# Patient Record
Sex: FEMALE | Race: WHITE | Employment: FULL TIME | URBAN - METROPOLITAN AREA
[De-identification: names, ages, dates, MRNs, and addresses within clinical notes are randomized per-mention and may not be internally consistent; named-entity substitution may affect disease eponyms.]

---

## 2017-07-30 ENCOUNTER — RADIANT APPOINTMENT (OUTPATIENT)
Dept: GENERAL RADIOLOGY | Facility: CLINIC | Age: 52
End: 2017-07-30
Payer: COMMERCIAL

## 2017-07-30 ENCOUNTER — OFFICE VISIT (OUTPATIENT)
Dept: URGENT CARE | Facility: URGENT CARE | Age: 52
End: 2017-07-30
Payer: COMMERCIAL

## 2017-07-30 VITALS
SYSTOLIC BLOOD PRESSURE: 148 MMHG | HEART RATE: 79 BPM | OXYGEN SATURATION: 97 % | WEIGHT: 175 LBS | TEMPERATURE: 98.1 F | HEIGHT: 62 IN | DIASTOLIC BLOOD PRESSURE: 86 MMHG | BODY MASS INDEX: 32.2 KG/M2

## 2017-07-30 DIAGNOSIS — M79.671 RIGHT FOOT PAIN: ICD-10-CM

## 2017-07-30 DIAGNOSIS — M79.671 RIGHT FOOT PAIN: Primary | ICD-10-CM

## 2017-07-30 DIAGNOSIS — M77.9 TENDINITIS: ICD-10-CM

## 2017-07-30 PROCEDURE — 73630 X-RAY EXAM OF FOOT: CPT | Mod: RT

## 2017-07-30 PROCEDURE — 99213 OFFICE O/P EST LOW 20 MIN: CPT | Performed by: FAMILY MEDICINE

## 2017-07-30 NOTE — NURSING NOTE
"Chief Complaint   Patient presents with     Urgent Care     Foot Injury     c/o foot pain for 1 day       Initial /86  Pulse 79  Temp 98.1  F (36.7  C) (Tympanic)  Ht 5' 1.5\" (1.562 m)  Wt 175 lb (79.4 kg)  SpO2 97%  BMI 32.53 kg/m2 Estimated body mass index is 32.53 kg/(m^2) as calculated from the following:    Height as of this encounter: 5' 1.5\" (1.562 m).    Weight as of this encounter: 175 lb (79.4 kg).  Medication Reconciliation: complete   Mulu Saeed MA    "

## 2017-07-30 NOTE — PATIENT INSTRUCTIONS
R.I.C.E.    R.I.C.E. stands for Rest, Ice, Compression, and Elevation. Doing these things helps limit pain and swelling after an injury. R.I.C.E. also helps injuries heal faster. Use R.I.C.E. for sprains, strains, and severe bruises or bumps. Follow the tips on this handout and begin R.I.C.E. as soon as possible after an injury.  ? Rest  Pain is your body s way of telling you to rest an injured area. Whether you have hurt an elbow, hand, foot, or knee, limiting its use will prevent further injury and help you heal.  ? Ice  Applying ice right after an injury helps prevent swelling and reduce pain. Don t place ice directly on your skin.    Wrap a cold pack or bag of ice in a thin cloth. Place it over the injured area.    Ice for 10 minutes every 3 hours. Don t ice for more than 20 minutes at a time.  ? Compression  Putting pressure (compression) on an injury helps prevent swelling and provides support.    Wrap the injured area firmly with an elastic bandage. If your hand or foot tingles, becomes discolored, or feels cold to the touch, the bandage may be too tight. Rewrap it more loosely.    If your bandage becomes too loose, rewrap it.    Do not wear an elastic bandage overnight.  ? Elevation  Keeping an injury elevated helps reduce swelling, pain, and throbbing. Elevation is most effective when the injury is kept elevated higher than the heart.     Call your healthcare provider if you notice any of the following:    Fingers or toes feel numb, are cold to the touch, or change color    Skin looks shiny or tight    Pain, swelling, or bruising worsens and is not improved with elevation   Date Last Reviewed: 9/3/2015    2250-3757 [employer]. [address] [place] [address], [place] [address]. All rights reserved. This information is not intended as a substitute for professional medical care. Always follow your healthcare professional's instructions.        What is Tendonitis of the Foot?  When you use a set of  muscles too much, you re likely to strain the tendons (soft tissues) that connect those muscles to your bones. At first, pain or swelling may come and go quickly. But if you do too much too soon, your muscles may overtire again. The strain may cause a tendon s outer covering to swell or small fibers in a tendon to pull apart. If you keep pushing your muscles, damage to the tendons adds up, and tendonitis develops. Over time, pain and swelling may limit your activities. But with your doctor s help, tendonitis can be controlled. Both your symptoms and your risk of future problems including tendon rupture can be reduced.       The back of your foot  The Achilles tendon connects the calf muscle to the heel bone. If tendonitis occurs here, you may feel pain when your foot touches down or when your heel lifts off the ground.   The front of your foot  The anterior tibial tendon helps control the front of your foot when it meets the ground. If this tendon is strained, you may feel pain when you go down stairs or walk or run on hills.     The inside of your foot  The posterior tibial tendon runs along the inside of the ankle and foot. If this tendon is strained, your foot may hurt when it moves forward to push off the ground. Or you may feel pain when your heel shifts from side to side.   The outside of your foot  The peroneal tendon wraps across the bottom of your foot, from the outside to the inside. Tendonitis here may cause pain when you stand or push off the ground and when walking on uneven surfaces.   Date Last Reviewed: 9/21/2015 2000-2017 [employer]. [address] [place] [address], [place] [address]. All rights reserved. This information is not intended as a substitute for professional medical care. Always follow your healthcare professional's instructions.
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
## 2017-07-30 NOTE — PROGRESS NOTES
"SUBJECTIVE:  Jenni Anderson, a 52 year old female scheduled an appointment to discuss the following issues:     Right foot pain  Tendinitis    Medical, social, surgical, and family histories reviewed.    Urgent Care      Foot Injury  c/o foot pain for 1 day      Pt has just recently driven across the country to [place] and back (14 hours).  Pt c/o pain at the medial aspect of her right foot.  No fall or trauma.  No open wound or paresthesia.    ROS:  See HPI.  No nausea/vomiting.  No fever/chills.  No chest pain/SOB.  No BM/urine problems.  No syncope.      OBJECTIVE:  /86  Pulse 79  Temp 98.1  F (36.7  C) (Tympanic)  Ht 5' 1.5\" (1.562 m)  Wt 175 lb (79.4 kg)  SpO2 97%  BMI 32.53 kg/m2  EXAM:  GENERAL APPEARANCE: alert and no distress  EYES: Eyes grossly normal to inspection, PERRL and conjunctivae and sclerae normal  HENT: ear canals and TM's normal and nose and mouth without ulcers or lesions  NECK: no adenopathy, no asymmetry, masses, or scars and thyroid normal to palpation  RESP: lungs clear to auscultation - no rales, rhonchi or wheezes  CV: regular rates and rhythm, normal S1 S2, no S3 or S4 and no murmur, click or rub  MS: extremities normal- no gross deformities noted; some tenderness at navicular bone; no crepitus or other bony tenderness; bilateral ankles and knees and hips exam normal. Mild discomfort on walking, no limp.  No open wound or paresthesia.  No redness or swelling; neurovascularly intact bilateral lower extremities  SKIN: no suspicious lesions or rashes  NEURO: Normal strength and tone, mentation intact and speech normal    ASSESSMENT/PLAN:  (M79.671) Right foot pain  (primary encounter diagnosis)  Comment: xray right foot: FINDINGS: Small inferior calcaneal spur. No acute abnormality of the  right foot. IMPRESSION: Nothing acute. Plantar calcaneal spur noted.  Plan: XR Foot Right G/E 3 Views           (M77.9) Tendinitis  Plan: Care instructions given.  Rest, ice, elevation, " gentle exercises.  Pt to f/up PCP in 2 weeks if no improvement or worsening.  Warning signs and symptoms explained.

## 2017-08-05 ENCOUNTER — HEALTH MAINTENANCE LETTER (OUTPATIENT)
Age: 52
End: 2017-08-05

## 2017-10-06 ENCOUNTER — THERAPY VISIT (OUTPATIENT)
Dept: PHYSICAL THERAPY | Facility: CLINIC | Age: 52
End: 2017-10-06
Payer: COMMERCIAL

## 2017-10-06 DIAGNOSIS — N39.3 STRESS INCONTINENCE OF URINE: Primary | ICD-10-CM

## 2017-10-06 PROCEDURE — 97535 SELF CARE MNGMENT TRAINING: CPT | Mod: GP | Performed by: PHYSICAL THERAPIST

## 2017-10-06 PROCEDURE — 97161 PT EVAL LOW COMPLEX 20 MIN: CPT | Mod: GP | Performed by: PHYSICAL THERAPIST

## 2017-10-06 PROCEDURE — 97112 NEUROMUSCULAR REEDUCATION: CPT | Mod: GP | Performed by: PHYSICAL THERAPIST

## 2017-10-06 NOTE — LETTER
[facility]  [address] [place] [id number] [phone]    October 10, 2017    Re: Jenni Anderson   :   1965  MRN:  [id number]   REFERRING PHYSICIAN:   Zora Vang    [facility]  Date of Initial Evaluation:  10/6/2017  Visits:  Rxs Used: 1  Reason for Referral:  Stress incontinence of urine    EVALUATION SUMMARY    [facility] Initial Evaluation  Subjective:  Patient is a 52 year old female presenting with rehab pelvic hpi. The history is provided by the patient.   Jenni Anderson is a 52 year old female with a incontinence condition.  Condition occurred with:  Insidious onset.  Condition occurred: other.  This is a chronic condition  MD order 2017  HPI: KAYLEE worse in last 6 mths with activity.  Practiced kegels for 6 mths with no improvement.  Considering sling placement but wants to learn how to do exercises..    Patient reports pain:  N/a.     and is intermittent    Pain is the same all the time.  Symptoms are exacerbated by jumping, coughing, laughing, intercourse, sneezing and running and relieved by nothing.  Since onset symptoms are gradually worsening.    Previous treatment includes other (kegels on her own).  There was no improvement following previous treatment.  General health as reported by patient is good.                  Red flags:  None as reported by the patient.    Pertinent medical history includes:  Overweight, high blood pressure, depression and migraines.  Medical allergies: none.  Other surgeries include:  None.  Current medications:  Sleep medication, anti-depressants and high blood pressure medication.  Current occupation is teacher.    Primary job tasks include:  Prolonged sitting and prolonged standing.                  Objective:  Pelvic Dysfunction Evaluation:    Bladder/Pelvic Problems:    Storage Problem:  Stress incontinence and frequency  Emptying Problem:   Strain to void and incomplete emptying      Diagnostic Tests:  none    Pelvic Clock Exam:  normal    External Assessment:    Skin Condition:  Normal    Bearing Down/Coughing:  Normal    Introitus:  Normal  Muscle Contraction/Perineal Mobility:  Substitution and elevation and urogential triangle descent    Re: Jenni Anderson   :   1965    Internal Assessment:    Contraction/Grade:  Fair squeeze, definite lift (3)  Accessory Muscle use-Gluteals:  Yes  Accessory Muscle use-Adductors:  Yes  Symmetry of Contraction Response:  Inc valsalva, sluggish return to resting; poor coordination w abdominals    SEMG Biofeedback:    Suraface electrode placement--Perianal:  Yes  Baseline EMG PM:  4-6 mV  Peak pelvic muscle contraction:  8-12 mV, slow return to baseline    Position:  SupineAdditional History:  Delivery History:    Number of Pregnancies: 2  Number of Live Births: 2  Caffeine Consumption:  Moderate          Urination:  Do you leak on the way to the bathroom or with a strong urge to void? Yes   Do you leak with cough,sneeze, jumping, running?Yes   Any other activities that cause leaking? Yes   Do you have triggers that make you feel you can't wait to go to the bathroom? No What are they? na.  Type of pad and number used per day? 1  When you leak what is the amount? Small to med  How long can you delay the need to urinate? Will go if I have access; can hold at work for long periods of time.   Do you feel excessive pressure in pelvic floor:no.  When? na  Frequency of daytime urination:every 30 min to 5 hrs  Frequency of nighttime urination:0  Can you stop the flow of urine when on the toilet? partially  Is the volume of urine passed usually: average. (8sec rule= 250ml with average bladder storing 400-600ml)  Do you strain to pass urine? Yes  Do you have a slow or hesitant urinary stream? No  Do you have difficulty initiating the urine stream? No  Is urination painful? No  How many bladder infections have  "you had in last 12 months?0  Fluid intake(one glass is 8oz or one cup) 8glasses/day, 3caffinated glasses/day  0 alcohol glasses/day.    Bowel habits:  Frequency of bowel movements? 1-2 times a day  Consistancy of stool? soft formed, [place] Stool Scale 4  Do you ignore the urge to defecate? No  Do you strain to pass stool? No    Pelvic Pain:  Do you have any pelvic pain with intercourse, exams, use of tampons? No  Is initial penetration during intercourse painful? No  Is deeper penetration painful? No  Do you use lubricant? No What kind? na  Are you sexually active?Yes  Have you ever been worried for your physical safety? No  Have you practiced the PF(kegel) exercises for 4 or more weeks?yes  Re: Jenni Anderson   :   1965    Marinoff Scale:Level na  (Level 3: Abstinence from intercourse because of severe pain. Level 2: Painful intercourse which limites frequency of activity. Level 1: Painful intercourse not severe enough to prevent activity.)    Treatment/Education provided this session (see flow sheet for additional information):    Self Care Management/Patient education (12 min): Today's session consisted of education regarding pelvic floor muscle anatomy, normal bladder function, urge suppression techniques and/or relaxation techniques as indicated, and instruction in how to complete a bladder diary for assessment next visit. Depending on patient presentation, timed voids, double voids, and proper fluid/fiber intake discussed. Pt was instructed in the pathoanatomy of the pelvic floor utilizing pelvic model.  We discussed what pelvic floor physical therapy is, components of exam, and typical patient progression. Prior to internal PFM exam,  patient was told that they were in control of exam progression, and if at any time were uncomfortable and wished to discontinue we would.      NMR (12 min): Patient instruction in correct isolation of PFM/TrA then coordinated contraction of \"canister\" muscles: " diaphragm, Transverse Abdominals, PFM, and posterior spine musculature.  Educated in initiating contraction from anal sphinctor, elevating through PFM, contraction of TrA, while exhaling slowly.  Cued for maximal and sub-maximal contractions, using hip rotators and adductors for overflow if needed.  Pt required cuing for each progression, with decreasing feedback as tolerated.  Instructed to avoid Valsalva/increased inter-abdominal pressure.  Pt cued through verbal, tactile (internal and external), and visual cuing utilizing biofeedback.  For HEP, pt encouraged to separate each phase of the exercise, then work towards coordination of the phases together with good breath technique with goal of developing good neuromuscular control of area.    NMR pelvic pain (12 min):  Pt education regarding contributing factors to pelvic pain and dysfunction related to overactivity in the pelvic floor.  Included resources for relaxed awareness and pelvic floor quieting techniqes.  Extra time spent describing pelvic floor muscle exam and treatment plan/goals, with attention to potential history that may contribute to current symptoms.  Included resources for home release techniques using dilators and/or thera wand as indicated.  Recommended partner involvement if available.  Discussed in detail potential physiological and behavioral components of pelvic pain.  Demonstrated/performed techniques for input to painful area while using visualization and relaxation.      Assessment/Plan:    Patient is a 52 year old female with pelvic complaints.    Patient has the following significant findings with corresponding treatment plan.                Diagnosis 1:  UI  Decreased strength - therapeutic exercise and therapeutic activities  Decreased proprioception - neuro re-education and therapeutic activities  Impaired muscle performance - neuro re-education  Decreased function - therapeutic activities    Therapy Evaluation Codes:   1) History  comprised of:   Personal factors that impact the plan of care:      Anxiety.    Comorbidity factors that impact the plan of care are:      None.     Medications impacting care: None.  2) Examination of Body Systems comprised of:   Body structures and functions that impact the plan of care:      Pelvis.   Activity limitations that impact the plan of care are:      Stress incontinence.  Re: Jenni Anderson   :   1965    3) Clinical presentation characteristics are:   Stable/Uncomplicated.  4) Decision-Making    Low complexity using standardized patient assessment instrument and/or measureable assessment of functional outcome.  Cumulative Therapy Evaluation is: Low complexity.    Previous and current functional limitations:  (See Goal Flow Sheet for this information)    Short term and Long term goals: (See Goal Flow Sheet for this information)     Communication ability:  Patient appears to be able to clearly communicate and understand verbal and written communication and follow directions correctly.  Treatment Explanation - The following has been discussed with the patient:   RX ordered/plan of care  Anticipated outcomes  Possible risks and side effects  This patient would benefit from PT intervention to resume normal activities.   Rehab potential is good.    Frequency:  1 X week, once daily  Duration:  for 8 weeks  Discharge Plan:  Achieve all LTG.  Independent in home treatment program.  Reach maximal therapeutic benefit.    Please refer to the daily flowsheet for treatment today, total treatment time and time spent performing 1:1 timed codes.         Thank you for your referral.    INQUIRIES  Therapist: Ella Sears, PT, OCS, Cer. MDT   [facility]  [address] [place] [address]  Phone: [phone]  Fax: [phone]

## 2017-10-06 NOTE — PROGRESS NOTES
[facility] Initial Evaluation    Subjective:    Patient is a 52 year old female presenting with rehab pelvic hpi. The history is provided by the patient.   Jenni Anderson is a 52 year old female with a incontinence condition.  Condition occurred with:  Insidious onset.  Condition occurred: other.  This is a chronic condition  MD ojeda 9/12/2017  HPI: KAYLEE worse in last 6 mths with activity.  Practiced kegels for 6 mths with no improvement.  Considering sling placement but wants to learn how to do exercises..    Patient reports pain:  N/a.     and is intermittent    Pain is the same all the time.  Symptoms are exacerbated by jumping, coughing, laughing, intercourse, sneezing and running and relieved by nothing.  Since onset symptoms are gradually worsening.    Previous treatment includes other (kegels on her own).  There was no improvement following previous treatment.  General health as reported by patient is good.                      Red flags:  None as reported by the patient.                        Objective:    System                                 Pelvic Dysfunction Evaluation:    Bladder/Pelvic Problems:    Storage Problem:  Stress incontinence and frequency  Emptying Problem:  Strain to void and incomplete emptying      Diagnostic Tests:  none                            Pelvic Clock Exam:  normal                External Assessment:    Skin Condition:  Normal    Bearing Down/Coughing:  Normal    Introitus:  Normal  Muscle Contraction/Perineal Mobility:  Substitution and elevation and urogential triangle descent  Internal Assessment:      Contraction/Grade:  Fair squeeze, definite lift (3)    Accessory Muscle use-Gluteals:  Yes  Accessory Muscle use-Adductors:  Yes  Symmetry of Contraction Response:  Inc valsalva, sluggish return to resting; poor coordination w abdominals  SEMG Biofeedback:        Suraface electrode placement--Perianal:  Yes  Baseline EMG PM:  4-6 mV    Peak pelvic  muscle contraction:  8-12 mV, slow return to baseline      Position:  SupineAdditional History:  Delivery History:    Number of Pregnancies: 2  Number of Live Births: 2  Caffeine Consumption:  Moderate                     General     ROS     Urination:  Do you leak on the way to the bathroom or with a strong urge to void? Yes   Do you leak with cough,sneeze, jumping, running?Yes   Any other activities that cause leaking? Yes   Do you have triggers that make you feel you can't wait to go to the bathroom? No What are they? na.  Type of pad and number used per day? 1  When you leak what is the amount? Small to med  How long can you delay the need to urinate? Will go if I have access; can hold at work for long periods of time.   Do you feel excessive pressure in pelvic floor:no.  When? na  Frequency of daytime urination:every 30 min to 5 hrs  Frequency of nighttime urination:0  Can you stop the flow of urine when on the toilet? partially  Is the volume of urine passed usually: average. (8sec rule= 250ml with average bladder storing 400-600ml)  Do you strain to pass urine? Yes  Do you have a slow or hesitant urinary stream? No  Do you have difficulty initiating the urine stream? No  Is urination painful? No  How many bladder infections have you had in last 12 months?0  Fluid intake(one glass is 8oz or one cup) 8glasses/day, 3caffinated glasses/day  0 alcohol glasses/day.  Bowel habits:  Frequency of bowel movements? 1-2 times a day  Consistancy of stool? soft formed, [place] Stool Scale 4  Do you ignore the urge to defecate? No  Do you strain to pass stool? No  Pelvic Pain:  Do you have any pelvic pain with intercourse, exams, use of tampons? No  Is initial penetration during intercourse painful? No  Is deeper penetration painful? No  Do you use lubricant? No What kind? na  Are you sexually active?Yes  Have you ever been worried for your physical safety? No  Have you practiced the PF(kegel) exercises for 4 or  "more weeks?yes  Marinoff Scale:Level na  (Level 3: Abstinence from intercourse because of severe pain. Level 2: Painful intercourse which limites frequency of activity. Level 1: Painful intercourse not severe enough to prevent activity.)    Treatment/Education provided this session (see flow sheet for additional information):    Self Care Management/Patient education (12 min): Today's session consisted of education regarding pelvic floor muscle anatomy, normal bladder function, urge suppression techniques and/or relaxation techniques as indicated, and instruction in how to complete a bladder diary for assessment next visit. Depending on patient presentation, timed voids, double voids, and proper fluid/fiber intake discussed. Pt was instructed in the pathoanatomy of the pelvic floor utilizing pelvic model.  We discussed what pelvic floor physical therapy is, components of exam, and typical patient progression. Prior to internal PFM exam,  patient was told that they were in control of exam progression, and if at any time were uncomfortable and wished to discontinue we would.        NMR (12 min): Patient instruction in correct isolation of PFM/TrA then coordinated contraction of \"canister\" muscles: diaphragm, Transverse Abdominals, PFM, and posterior spine musculature.  Educated in initiating contraction from anal sphinctor, elevating through PFM, contraction of TrA, while exhaling slowly.  Cued for maximal and sub-maximal contractions, using hip rotators and adductors for overflow if needed.  Pt required cuing for each progression, with decreasing feedback as tolerated.  Instructed to avoid Valsalva/increased inter-abdominal pressure.  Pt cued through verbal, tactile (internal and external), and visual cuing utilizing biofeedback.  For HEP, pt encouraged to separate each phase of the exercise, then work towards coordination of the phases together with good breath technique with goal of developing good neuromuscular " control of area.        NMR pelvic pain (12 min):  Pt education regarding contributing factors to pelvic pain and dysfunction related to overactivity in the pelvic floor.  Included resources for relaxed awareness and pelvic floor quieting techniqes.  Extra time spent describing pelvic floor muscle exam and treatment plan/goals, with attention to potential history that may contribute to current symptoms.  Included resources for home release techniques using dilators and/or thera wand as indicated.  Recommended partner involvement if available.  Discussed in detail potential physiological and behavioral components of pelvic pain.  Demonstrated/performed techniques for input to painful area while using visualization and relaxation.                      Assessment/Plan:      Patient is a 52 year old female with pelvic complaints.    Patient has the following significant findings with corresponding treatment plan.                Diagnosis 1:  UI  Decreased strength - therapeutic exercise and therapeutic activities  Decreased proprioception - neuro re-education and therapeutic activities  Impaired muscle performance - neuro re-education  Decreased function - therapeutic activities    Therapy Evaluation Codes:   1) History comprised of:   Personal factors that impact the plan of care:      Anxiety.    Comorbidity factors that impact the plan of care are:      None.     Medications impacting care: None.  2) Examination of Body Systems comprised of:   Body structures and functions that impact the plan of care:      Pelvis.   Activity limitations that impact the plan of care are:      Stress incontinence.  3) Clinical presentation characteristics are:   Stable/Uncomplicated.  4) Decision-Making    Low complexity using standardized patient assessment instrument and/or measureable assessment of functional outcome.  Cumulative Therapy Evaluation is: Low complexity.    Previous and current functional limitations:  (See Goal Flow  Sheet for this information)    Short term and Long term goals: (See Goal Flow Sheet for this information)     Communication ability:  Patient appears to be able to clearly communicate and understand verbal and written communication and follow directions correctly.  Treatment Explanation - The following has been discussed with the patient:   RX ordered/plan of care  Anticipated outcomes  Possible risks and side effects  This patient would benefit from PT intervention to resume normal activities.   Rehab potential is good.    Frequency:  1 X week, once daily  Duration:  for 8 weeks  Discharge Plan:  Achieve all LTG.  Independent in home treatment program.  Reach maximal therapeutic benefit.    Please refer to the daily flowsheet for treatment today, total treatment time and time spent performing 1:1 timed codes.

## 2017-10-10 PROBLEM — N39.3 STRESS INCONTINENCE OF URINE: Status: ACTIVE | Noted: 2017-10-10

## 2017-10-10 NOTE — PROGRESS NOTES
Subjective:    Patient is a 52 year old female presenting with rehab pelvic hpi.                                      Pertinent medical history includes:  Overweight, high blood pressure, depression and migraines.  Medical allergies: none.  Other surgeries include:  None.  Current medications:  Sleep medication, anti-depressants and high blood pressure medication.  Current occupation is teacher.    Primary job tasks include:  Prolonged sitting and prolonged standing.                                Objective:    System    Physical Exam    General     ROS    Assessment/Plan:

## 2017-10-24 ENCOUNTER — THERAPY VISIT (OUTPATIENT)
Dept: PHYSICAL THERAPY | Facility: CLINIC | Age: 52
End: 2017-10-24
Payer: COMMERCIAL

## 2017-10-24 DIAGNOSIS — N39.3 STRESS INCONTINENCE OF URINE: ICD-10-CM

## 2017-10-24 PROCEDURE — 97535 SELF CARE MNGMENT TRAINING: CPT | Mod: GP | Performed by: PHYSICAL THERAPIST

## 2017-10-24 PROCEDURE — 97110 THERAPEUTIC EXERCISES: CPT | Mod: GP | Performed by: PHYSICAL THERAPIST

## 2019-06-07 ENCOUNTER — OFFICE VISIT (OUTPATIENT)
Dept: URGENT CARE | Facility: URGENT CARE | Age: 54
End: 2019-06-07

## 2019-06-07 VITALS
RESPIRATION RATE: 12 BRPM | WEIGHT: 160 LBS | HEART RATE: 70 BPM | DIASTOLIC BLOOD PRESSURE: 76 MMHG | TEMPERATURE: 98.3 F | BODY MASS INDEX: 31.41 KG/M2 | SYSTOLIC BLOOD PRESSURE: 124 MMHG | HEIGHT: 60 IN

## 2019-06-07 DIAGNOSIS — S00.03XA CONTUSION OF FACE, SCALP AND NECK, INITIAL ENCOUNTER: Primary | ICD-10-CM

## 2019-06-07 DIAGNOSIS — S10.93XA CONTUSION OF FACE, SCALP AND NECK, INITIAL ENCOUNTER: Primary | ICD-10-CM

## 2019-06-07 DIAGNOSIS — S00.83XA CONTUSION OF FACE, SCALP AND NECK, INITIAL ENCOUNTER: Primary | ICD-10-CM

## 2019-06-07 PROCEDURE — 99213 OFFICE O/P EST LOW 20 MIN: CPT | Performed by: INTERNAL MEDICINE

## 2019-06-07 RX ORDER — LOSARTAN POTASSIUM 25 MG/1
25 TABLET ORAL EVERY MORNING
COMMUNITY

## 2019-06-07 ASSESSMENT — MIFFLIN-ST. JEOR: SCORE: 1258.61
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
## 2019-07-12 ENCOUNTER — HOSPITAL ENCOUNTER (EMERGENCY)
Facility: CLINIC | Age: 54
Discharge: HOME | End: 2019-07-12
Attending: EMERGENCY MEDICINE | Admitting: EMERGENCY MEDICINE
Payer: COMMERCIAL

## 2019-07-12 ENCOUNTER — APPOINTMENT (OUTPATIENT)
Dept: ULTRASOUND IMAGING | Facility: CLINIC | Age: 54
End: 2019-07-12
Attending: EMERGENCY MEDICINE
Payer: COMMERCIAL

## 2019-07-12 VITALS
HEIGHT: 61 IN | BODY MASS INDEX: 31.15 KG/M2 | OXYGEN SATURATION: 96 % | TEMPERATURE: 98.4 F | WEIGHT: 165 LBS | RESPIRATION RATE: 18 BRPM | SYSTOLIC BLOOD PRESSURE: 128 MMHG | HEART RATE: 73 BPM | DIASTOLIC BLOOD PRESSURE: 81 MMHG

## 2019-07-12 DIAGNOSIS — D25.9 UTERINE LEIOMYOMA, UNSPECIFIED LOCATION: ICD-10-CM

## 2019-07-12 LAB
BASOPHILS # BLD AUTO: 0 10E9/L (ref 0–0.2)
BASOPHILS NFR BLD AUTO: 0.4 %
DIFFERENTIAL METHOD BLD: ABNORMAL
EOSINOPHIL # BLD AUTO: 0.2 10E9/L (ref 0–0.7)
EOSINOPHIL NFR BLD AUTO: 1.5 %
ERYTHROCYTE [DISTWIDTH] IN BLOOD BY AUTOMATED COUNT: 12.4 % (ref 10–15)
HCG SERPL QL: NEGATIVE
HCT VFR BLD AUTO: 40.7 % (ref 35–47)
HGB BLD-MCNC: 14.2 G/DL (ref 11.7–15.7)
IMM GRANULOCYTES # BLD: 0 10E9/L (ref 0–0.4)
IMM GRANULOCYTES NFR BLD: 0.1 %
LYMPHOCYTES # BLD AUTO: 2.2 10E9/L (ref 0.8–5.3)
LYMPHOCYTES NFR BLD AUTO: 20.1 %
MCH RBC QN AUTO: 30.6 PG (ref 26.5–33)
MCHC RBC AUTO-ENTMCNC: 34.9 G/DL (ref 31.5–36.5)
MCV RBC AUTO: 88 FL (ref 78–100)
MONOCYTES # BLD AUTO: 0.5 10E9/L (ref 0–1.3)
MONOCYTES NFR BLD AUTO: 4.9 %
NEUTROPHILS # BLD AUTO: 8 10E9/L (ref 1.6–8.3)
NEUTROPHILS NFR BLD AUTO: 73 %
NRBC # BLD AUTO: 0 10*3/UL
NRBC BLD AUTO-RTO: 0 /100
PLATELET # BLD AUTO: 489 10E9/L (ref 150–450)
RBC # BLD AUTO: 4.64 10E12/L (ref 3.8–5.2)
SPECIMEN SOURCE: NORMAL
WBC # BLD AUTO: 11 10E9/L (ref 4–11)
WET PREP SPEC: NORMAL

## 2019-07-12 PROCEDURE — 84703 CHORIONIC GONADOTROPIN ASSAY: CPT | Performed by: EMERGENCY MEDICINE

## 2019-07-12 PROCEDURE — 87591 N.GONORRHOEAE DNA AMP PROB: CPT | Performed by: EMERGENCY MEDICINE

## 2019-07-12 PROCEDURE — 76830 TRANSVAGINAL US NON-OB: CPT

## 2019-07-12 PROCEDURE — 25000132 ZZH RX MED GY IP 250 OP 250 PS 637: Performed by: EMERGENCY MEDICINE

## 2019-07-12 PROCEDURE — 85025 COMPLETE CBC W/AUTO DIFF WBC: CPT | Performed by: EMERGENCY MEDICINE

## 2019-07-12 PROCEDURE — 87210 SMEAR WET MOUNT SALINE/INK: CPT | Performed by: EMERGENCY MEDICINE

## 2019-07-12 PROCEDURE — 87491 CHLMYD TRACH DNA AMP PROBE: CPT | Performed by: EMERGENCY MEDICINE

## 2019-07-12 PROCEDURE — 99284 EMERGENCY DEPT VISIT MOD MDM: CPT | Mod: 25

## 2019-07-12 RX ORDER — IBUPROFEN 600 MG/1
600 TABLET, FILM COATED ORAL ONCE
Status: COMPLETED | OUTPATIENT
Start: 2019-07-12 | End: 2019-07-12

## 2019-07-12 RX ORDER — ACETAMINOPHEN 325 MG/1
650 TABLET ORAL ONCE
Status: COMPLETED | OUTPATIENT
Start: 2019-07-12 | End: 2019-07-12

## 2019-07-12 RX ORDER — HYDROCODONE BITARTRATE AND ACETAMINOPHEN 5; 325 MG/1; MG/1
1 TABLET ORAL ONCE
Status: COMPLETED | OUTPATIENT
Start: 2019-07-12 | End: 2019-07-12

## 2019-07-12 RX ADMIN — HYDROCODONE BITARTRATE AND ACETAMINOPHEN 1 TABLET: 5; 325 TABLET ORAL at 16:12

## 2019-07-12 RX ADMIN — IBUPROFEN 600 MG: 600 TABLET ORAL at 16:12

## 2019-07-12 RX ADMIN — ACETAMINOPHEN 650 MG: 325 TABLET, FILM COATED ORAL at 16:12

## 2019-07-12 ASSESSMENT — MIFFLIN-ST. JEOR: SCORE: 1290.82

## 2019-07-12 NOTE — ED AVS SNAPSHOT
Emergency Department  [address] [facility] [address]  Phone:  [phone]  Fax:  [phone]                                    Jenni Anderson   MRN: [id number]    Department:   Emergency Department   Date of Visit:  7/12/2019           After Visit Summary Signature Page    I have received my discharge instructions, and my questions have been answered. I have discussed any challenges I see with this plan with the nurse or doctor.    ..........................................................................................................................................  Patient/Patient Representative Signature      ..........................................................................................................................................  Patient Representative Print Name and Relationship to Patient    ..................................................               ................................................  Date                                   Time    ..........................................................................................................................................  Reviewed by Signature/Title    ...................................................              ..............................................  Date                                               Time          22EPIC Rev 08/18
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
## 2019-07-12 NOTE — ED NOTES
Pt to Ultrasound.  Yamilex Patel RN,.......................................... 7/12/2019   5:21 PM

## 2019-07-12 NOTE — ED TRIAGE NOTES
Patient states her IUD may have shifted. States she has had bleeding for weeks but pelvic pain that has increased in severity today.

## 2019-07-14 LAB
C TRACH DNA SPEC QL NAA+PROBE: NEGATIVE
N GONORRHOEA DNA SPEC QL NAA+PROBE: NEGATIVE
SPECIMEN SOURCE: NORMAL
SPECIMEN SOURCE: NORMAL

## 2019-10-19 ENCOUNTER — HOSPITAL ENCOUNTER (EMERGENCY)
Facility: CLINIC | Age: 54
Discharge: HOME | End: 2019-10-19
Attending: EMERGENCY MEDICINE | Admitting: EMERGENCY MEDICINE
Payer: COMMERCIAL

## 2019-10-19 VITALS
WEIGHT: 175 LBS | RESPIRATION RATE: 16 BRPM | OXYGEN SATURATION: 98 % | DIASTOLIC BLOOD PRESSURE: 92 MMHG | BODY MASS INDEX: 33.04 KG/M2 | TEMPERATURE: 97.5 F | HEIGHT: 61 IN | SYSTOLIC BLOOD PRESSURE: 158 MMHG

## 2019-10-19 DIAGNOSIS — H93.8X1 SENSATION OF FULLNESS IN RIGHT EAR: ICD-10-CM

## 2019-10-19 DIAGNOSIS — H93.11 TINNITUS, RIGHT: ICD-10-CM

## 2019-10-19 DIAGNOSIS — G47.00 INSOMNIA, UNSPECIFIED TYPE: ICD-10-CM

## 2019-10-19 PROCEDURE — 99283 EMERGENCY DEPT VISIT LOW MDM: CPT

## 2019-10-19 PROCEDURE — 25000125 ZZHC RX 250: Performed by: EMERGENCY MEDICINE

## 2019-10-19 RX ORDER — LORAZEPAM 1 MG/1
1 TABLET ORAL
Qty: 5 TABLET | Refills: 0 | Status: SHIPPED | OUTPATIENT
Start: 2019-10-19 | End: 2021-01-27

## 2019-10-19 RX ORDER — OXYMETAZOLINE HYDROCHLORIDE 0.05 G/100ML
2 SPRAY NASAL ONCE
Status: COMPLETED | OUTPATIENT
Start: 2019-10-19 | End: 2019-10-19

## 2019-10-19 RX ADMIN — OXYMETAZOLINE HYDROCHLORIDE 2 SPRAY: 0.5 SPRAY NASAL at 05:20

## 2019-10-19 ASSESSMENT — MIFFLIN-ST. JEOR: SCORE: 1336.17

## 2019-10-19 NOTE — DISCHARGE INSTRUCTIONS
May use Afrin for the next 3 days, twice a day to help with nasal congestion as this may be affecting the fullness that you are having in your ear.  If this improves you may want to try a nasal steroid such as Flonase which you can buy over-the-counter as well.  In regards to the ringing please follow-up with ENT.  If you develop severe headache, severe neck pain, double vision, difficulty walking, weakness or numbness or tingling please return to the emergency department sooner.    May try Ativan for sleep for the next several nights to help with your anxiety and sleeping.  If this is helpful you need to follow-up with your primary care doctor to determine a better course of action for long-term sleep management.

## 2019-10-19 NOTE — ED AVS SNAPSHOT
Emergency Department  [address] [facility] [address]  Phone:  [phone]  Fax:  [phone]                                    Jenni Anderson   MRN: [id number]    Department:   Emergency Department   Date of Visit:  10/19/2019           After Visit Summary Signature Page    I have received my discharge instructions, and my questions have been answered. I have discussed any challenges I see with this plan with the nurse or doctor.    ..........................................................................................................................................  Patient/Patient Representative Signature      ..........................................................................................................................................  Patient Representative Print Name and Relationship to Patient    ..................................................               ................................................  Date                                   Time    ..........................................................................................................................................  Reviewed by Signature/Title    ...................................................              ..............................................  Date                                               Time          22EPIC Rev 08/18

## 2019-10-19 NOTE — ED PROVIDER NOTES
History     Chief Complaint:  Tinnitus    HPI   Jenni Anderson is a 54 year old female with a history of hypertension who presents to the emergency department due to tinnitus. About a week ago, the patient states that she got soapy water in her ears and has had muffled hearing since then. Tonight, she states that she states that she had difficulty sleeping, which has been an ongoing problem for her. She woke up around 0130 and noticed she had ringing in her ears. She reports anxiety and a panic sensation in conjunction to the ringing which ultimately prompted her visit to the ED. She complains of feeling lightheaded as well as mild congestion but attributes these to a lack of sleep and seasonal allergies. She denies numbness/tingling, fever, chill, nausea, chest pain, diaphoresis, and headaches. She also denies use of any drugs or alcohol and recent travel. Of note, patient had recent MRA as noted below.       MRA Neck and Neck w/o and w/ contrast angiogram ([employer] 3/10/19):  1. No acute intracranial abnormality.    2. No abnormal enhancement or enhancing lesions.    3. Normal brain parenchymal morphology. Few scattered foci of T2 signal within the white matter consistent with chronic small vessel ischemic changes or sequela migraine headache.    4. Right mastoid effusion may indicate underlying mastoiditis or otitis mediaas per radiology       Allergies:  No known drug allergies    Medications:    amoxicillin-clavulanate   aspirin 81 MG   bupropion   hydrochlorothyazide  Losartan potassium  Diazepam  Melcizine    Past Medical History:    Stress incontinence of urine  Dysthymia-deactivated  Attention deficit disorder   Allergic rhinitis  Insomnia  Hypertension  Major Depressive disorder    Past Surgical History:   Section    Family History:    Heart Disease - Father  Lung Cancer - Father  Hypertension - Mother  Osteopenia -Mother  Diabetes- Father  Depression - Mother    Social  "History:  Smoking status: Never Smoker  Alcohol use: Yes  Drug use: No  The patient presents to the emergency department by herself  PCP: Meagan Vizcarra    Marital Status:   [2]    Review of Systems   All other systems reviewed and are negative.      Physical Exam     Patient Vitals for the past 24 hrs:   BP Temp Temp src Heart Rate Resp SpO2 Height Weight   10/19/19 0446 (!) 158/92 97.5  F (36.4  C) Oral 81 16 98 % 1.549 m (5' 1\") 79.4 kg (175 lb)         Physical Exam  General: Resting on the bed.  Head: No obvious trauma to head.  Ears, Nose, Throat:  External ears normal.  Nose normal.  No pharyngeal erythema, swelling or exudate.  Midline uvula.  TMs clear bilaterally, there does appear to be some pressure behind the right TM.  No evidence of exudate or effusion.  Eyes:  Conjunctivae clear.  Pupils are equal, round, and reactive.   Neck: Normal range of motion.  Neck supple.   CV: Regular rate and rhythm.  No murmurs.      Respiratory: Effort normal and breath sounds normal.  No wheezing or crackles.   Gastrointestinal: Soft.  No distension. There is no tenderness.    Neuro: Alert. Moving all extremities appropriately.  Normal speech.  CN II-XII grossly intact.  Gross muscle strength intact of the proximal and distal bilateral upper and lower extremities.  Sensation intact to light touch in all 4 extremities.  2+ patellar reflexes.    Skin: Skin is warm and dry.  No rash noted.     Emergency Department Course   Interventions:  0520 Afrin 2 sprays ears    Emergency Department Course:  Nursing notes and vitals reviewed. (0500) I performed an exam of the patient as documented above.     Medicine administered as documented above    I rechecked the patient and discussed the results of her workup thus far.     Findings and plan explained to the Patient. Patient discharged home with instructions regarding supportive care, medications, and reasons to return. The importance of close follow-up was reviewed. The " patient was prescribed Ativan.     Impression & Plan    Medical Decision Makin-year-old female with a history of hypertension on hydrochlorothiazide presents with tinnitus.  Vital signs mildly hypertensive but otherwise unremarkable.  Broad differential was pursued including not limited to medication induced, otitis media, vascular, anxiety or insomnia induced, sensorineural hearing loss or cochlear injury, etc.  Patient of note had a recent MRI MRA that showed no evidence of acute stroke, tumor, mass.  No vascular disease or aneurysm.  History does not appear consistent with dissection.  No indication for emergent imaging in the emergency department.  On examination there is no evidence of otitis media.  She had some suggestion of congestion so we tried Afrin to help with the fullness in her ears.  She did endorse a high-pitched noise which may be indicative of a sensorineural hearing loss or cochlear injury.  This would be something that needs further work-up with ENT.  Of note, patient does appear quite anxious and has not been sleeping.  These both may be exacerbating her symptoms.  We gave Afrin with some improvement but will discharge with short supply of Ativan to help with insomnia and anxiety to use at home.  She is encouraged to follow-up with her primary doctor and ENT for further assessment of this.  We discussed return precautions including severe head or neck pain, weakness or numbness or tingling, etc.  She voiced understanding the plan and felt comfortable with return precautions.  She was discharged home.    Diagnosis:    ICD-10-CM    1. Sensation of fullness in right ear H93.8X1    2. Tinnitus, right H93.11    3. Insomnia, unspecified type G47.00        Disposition:  discharged to home    Discharge Medications:  Discharge Medication List as of 10/19/2019  6:41 AM      START taking these medications    Details   LORazepam (ATIVAN) 1 MG tablet Take 1 tablet (1 mg) by mouth nightly as needed for  anxiety, Disp-5 tablet, R-0, Local Print           Scribe Disclosure:  I,  Mg Earl, am serving as a scribe on 10/19/2019 at 6:44 AM to personally document services performed by Sandra Tyson MD based on my observations and the provider's statements to me.     Scribe Disclosure:  I,  Wili Houston, am serving as a scribe on 10/19/2019 at 6:44 AM to personally document services performed by Sandra Tyson MD based on my observations and the provider's statements to me.         Wili Houston  10/19/2019    EMERGENCY DEPARTMENT       Sandra Tyson MD  10/19/19 0704

## 2019-11-05 ENCOUNTER — HEALTH MAINTENANCE LETTER (OUTPATIENT)
Age: 54
End: 2019-11-05

## 2020-11-22 ENCOUNTER — HEALTH MAINTENANCE LETTER (OUTPATIENT)
Age: 55
End: 2020-11-22

## 2020-12-08 ENCOUNTER — TRANSFERRED RECORDS (OUTPATIENT)
Dept: HEALTH INFORMATION MANAGEMENT | Facility: CLINIC | Age: 55
End: 2020-12-08

## 2021-01-07 PROBLEM — D25.1 INTRAMURAL LEIOMYOMA OF UTERUS: Status: ACTIVE | Noted: 2021-01-07

## 2021-01-07 PROBLEM — N93.9 ABNORMAL UTERINE BLEEDING: Status: ACTIVE | Noted: 2021-01-07

## 2021-01-08 DIAGNOSIS — Z11.59 ENCOUNTER FOR SCREENING FOR OTHER VIRAL DISEASES: Primary | ICD-10-CM

## 2021-01-13 ENCOUNTER — TRANSFERRED RECORDS (OUTPATIENT)
Dept: HEALTH INFORMATION MANAGEMENT | Facility: CLINIC | Age: 56
End: 2021-01-13

## 2021-01-24 ENCOUNTER — OFFICE VISIT (OUTPATIENT)
Dept: URGENT CARE | Facility: URGENT CARE | Age: 56
End: 2021-01-24
Attending: SPECIALIST
Payer: COMMERCIAL

## 2021-01-24 DIAGNOSIS — Z11.59 ENCOUNTER FOR SCREENING FOR OTHER VIRAL DISEASES: ICD-10-CM

## 2021-01-24 LAB
SARS-COV-2 RNA RESP QL NAA+PROBE: NORMAL
SPECIMEN SOURCE: NORMAL

## 2021-01-24 PROCEDURE — 87635 SARS-COV-2 COVID-19 AMP PRB: CPT | Performed by: SPECIALIST

## 2021-01-25 LAB
LABORATORY COMMENT REPORT: NORMAL
SARS-COV-2 RNA RESP QL NAA+PROBE: NEGATIVE
SPECIMEN SOURCE: NORMAL

## 2021-01-27 ENCOUNTER — ANESTHESIA EVENT (OUTPATIENT)
Dept: SURGERY | Facility: CLINIC | Age: 56
DRG: 742 | End: 2021-01-27
Payer: COMMERCIAL

## 2021-01-27 RX ORDER — ATOMOXETINE 60 MG/1
60 CAPSULE ORAL EVERY MORNING
COMMUNITY

## 2021-01-27 RX ORDER — HYDROXYZINE HYDROCHLORIDE 25 MG/1
50 TABLET, FILM COATED ORAL AT BEDTIME
COMMUNITY

## 2021-01-27 RX ORDER — UBIDECARENONE 100 MG
100 CAPSULE ORAL DAILY
COMMUNITY

## 2021-01-27 RX ORDER — HYDRALAZINE HYDROCHLORIDE 25 MG/1
50 TABLET, FILM COATED ORAL AT BEDTIME
COMMUNITY
End: 2021-01-27

## 2021-01-27 RX ORDER — TRAZODONE HYDROCHLORIDE 50 MG/1
50 TABLET, FILM COATED ORAL AT BEDTIME
COMMUNITY

## 2021-01-27 RX ORDER — DULOXETIN HYDROCHLORIDE 60 MG/1
60 CAPSULE, DELAYED RELEASE ORAL EVERY EVENING
COMMUNITY

## 2021-01-27 RX ORDER — OMEGA-3 FATTY ACIDS/FISH OIL 300-1000MG
1 CAPSULE ORAL DAILY
COMMUNITY

## 2021-01-27 RX ORDER — BUPROPION HYDROCHLORIDE 300 MG/1
300 TABLET ORAL EVERY MORNING
COMMUNITY

## 2021-01-27 NOTE — PROGRESS NOTES
PTA medications updated by Medication Scribe prior to surgery via phone call with patient      -LAST DOSES ENTERED BY NURSE-    Comments:    Medication history sources: Patient, Surescripts and H&P  Medication history source reliability: Moderate  Adherence assessment: N/A Not Observed    Significant changes made to the medication list:  None      Additional medication history information:   Pt bringing in own OTC Melatonin gummies - she would like to continue taking these while inpt due to her tinnitus     Pt states has not started Anastrozole 1mg yet (listed on surescripts); She says she will start this 2 weeks after surgery        Prior to Admission medications    Medication Sig Last Dose Taking? Auth Provider   atomoxetine (STRATTERA) 60 MG capsule Take 60 mg by mouth every morning  at AM Yes Reported, Patient   Bioflavonoid Products (BIOFLEX PO) Take 1 Dose by mouth daily 1/18/2021 Yes Reported, Patient   buPROPion (WELLBUTRIN XL) 300 MG 24 hr tablet Take 300 mg by mouth every morning  at AM Yes Reported, Patient   co-enzyme Q-10 100 MG CAPS capsule Take 100 mg by mouth daily 1/18/2021 Yes Reported, Patient   DULoxetine (CYMBALTA) 60 MG capsule Take 60 mg by mouth every evening  at PM Yes Reported, Patient   hydrochlorothiazide (HYDRODIURIL) 25 MG tablet Take 25 mg by mouth every morning   at AM Yes Reported, Patient   hydrOXYzine (ATARAX) 25 MG tablet Take 50 mg by mouth At Bedtime (2 x 25mg)  at HS Yes Reported, Patient   losartan (COZAAR) 25 MG tablet Take 25 mg by mouth every morning   at AM Yes Reported, Patient   MAGNESIUM PO Take 1 Dose by mouth daily (OTC) 1/18/2021 Yes Reported, Patient   MELATONIN PO Take 1 Dose by mouth At Bedtime  at HS Yes Reported, Patient   Multiple Vitamin (MULTIVITAMIN PO) Take 1 tablet by mouth daily 1/21/2021 Yes Reported, Patient   Nutritional Supplements (GRAPESEED EXTRACT PO) Take 1 tablet by mouth daily 1/18/2021 Yes Reported, Patient   omega 3 1000 MG CAPS Take 1 Dose by  "mouth daily 1/22/2021 Yes Reported, Patient   OVER-THE-COUNTER Take 1 Dose by mouth daily \"Phytoceramides\" 1/18/2021 Yes Reported, Patient   traZODone (DESYREL) 50 MG tablet Take 50 mg by mouth At Bedtime  at  Yes Reported, Patient       "

## 2021-01-28 ENCOUNTER — HOSPITAL ENCOUNTER (INPATIENT)
Facility: CLINIC | Age: 56
LOS: 3 days | Discharge: HOME | DRG: 742 | End: 2021-01-31
Attending: SPECIALIST | Admitting: SPECIALIST
Payer: COMMERCIAL

## 2021-01-28 ENCOUNTER — ANESTHESIA (OUTPATIENT)
Dept: SURGERY | Facility: CLINIC | Age: 56
DRG: 742 | End: 2021-01-28
Payer: COMMERCIAL

## 2021-01-28 DIAGNOSIS — N39.3 STRESS INCONTINENCE OF URINE: Primary | ICD-10-CM

## 2021-01-28 DIAGNOSIS — Z90.710 S/P ABDOMINAL HYSTERECTOMY: ICD-10-CM

## 2021-01-28 LAB
B-HCG SERPL-ACNC: <1 IU/L (ref 0–5)
CREAT SERPL-MCNC: 0.64 MG/DL (ref 0.52–1.04)
GFR SERPL CREATININE-BSD FRML MDRD: >90 ML/MIN/{1.73_M2}
GLUCOSE BLDC GLUCOMTR-MCNC: 214 MG/DL (ref 70–99)
GLUCOSE SERPL-MCNC: 147 MG/DL (ref 70–99)
HGB BLD-MCNC: 13.4 G/DL (ref 11.7–15.7)
POTASSIUM SERPL-SCNC: 3.6 MMOL/L (ref 3.4–5.3)

## 2021-01-28 PROCEDURE — 999N000141 HC STATISTIC PRE-PROCEDURE NURSING ASSESSMENT: Performed by: SPECIALIST

## 2021-01-28 PROCEDURE — 258N000003 HC RX IP 258 OP 636: Performed by: NURSE ANESTHETIST, CERTIFIED REGISTERED

## 2021-01-28 PROCEDURE — 82947 ASSAY GLUCOSE BLOOD QUANT: CPT | Performed by: SPECIALIST

## 2021-01-28 PROCEDURE — 120N000001 HC R&B MED SURG/OB

## 2021-01-28 PROCEDURE — 272N000001 HC OR GENERAL SUPPLY STERILE: Performed by: SPECIALIST

## 2021-01-28 PROCEDURE — 0UT20ZZ RESECTION OF BILATERAL OVARIES, OPEN APPROACH: ICD-10-PCS | Performed by: SPECIALIST

## 2021-01-28 PROCEDURE — 250N000011 HC RX IP 250 OP 636: Performed by: SPECIALIST

## 2021-01-28 PROCEDURE — 0TJB8ZZ INSPECTION OF BLADDER, VIA NATURAL OR ARTIFICIAL OPENING ENDOSCOPIC: ICD-10-PCS | Performed by: OBSTETRICS & GYNECOLOGY

## 2021-01-28 PROCEDURE — 0UT70ZZ RESECTION OF BILATERAL FALLOPIAN TUBES, OPEN APPROACH: ICD-10-PCS | Performed by: SPECIALIST

## 2021-01-28 PROCEDURE — 82565 ASSAY OF CREATININE: CPT | Performed by: SPECIALIST

## 2021-01-28 PROCEDURE — 85018 HEMOGLOBIN: CPT | Performed by: SPECIALIST

## 2021-01-28 PROCEDURE — 250N000011 HC RX IP 250 OP 636: Performed by: NURSE ANESTHETIST, CERTIFIED REGISTERED

## 2021-01-28 PROCEDURE — 258N000003 HC RX IP 258 OP 636: Performed by: SPECIALIST

## 2021-01-28 PROCEDURE — 88304 TISSUE EXAM BY PATHOLOGIST: CPT | Mod: TC | Performed by: SPECIALIST

## 2021-01-28 PROCEDURE — 250N000011 HC RX IP 250 OP 636: Performed by: ANESTHESIOLOGY

## 2021-01-28 PROCEDURE — 250N000013 HC RX MED GY IP 250 OP 250 PS 637: Performed by: SPECIALIST

## 2021-01-28 PROCEDURE — 88304 TISSUE EXAM BY PATHOLOGIST: CPT | Mod: 26 | Performed by: PATHOLOGY

## 2021-01-28 PROCEDURE — 360N000076 HC SURGERY LEVEL 3, PER MIN: Performed by: SPECIALIST

## 2021-01-28 PROCEDURE — 0TSD0ZZ REPOSITION URETHRA, OPEN APPROACH: ICD-10-PCS | Performed by: OBSTETRICS & GYNECOLOGY

## 2021-01-28 PROCEDURE — 250N000009 HC RX 250: Performed by: SPECIALIST

## 2021-01-28 PROCEDURE — 250N000025 HC SEVOFLURANE, PER MIN: Performed by: SPECIALIST

## 2021-01-28 PROCEDURE — 88307 TISSUE EXAM BY PATHOLOGIST: CPT | Mod: 26 | Performed by: PATHOLOGY

## 2021-01-28 PROCEDURE — 84702 CHORIONIC GONADOTROPIN TEST: CPT | Performed by: SPECIALIST

## 2021-01-28 PROCEDURE — 370N000017 HC ANESTHESIA TECHNICAL FEE, PER MIN: Performed by: SPECIALIST

## 2021-01-28 PROCEDURE — 0TJD8ZZ INSPECTION OF URETHRA, VIA NATURAL OR ARTIFICIAL OPENING ENDOSCOPIC: ICD-10-PCS | Performed by: OBSTETRICS & GYNECOLOGY

## 2021-01-28 PROCEDURE — 258N000003 HC RX IP 258 OP 636: Performed by: ANESTHESIOLOGY

## 2021-01-28 PROCEDURE — 84132 ASSAY OF SERUM POTASSIUM: CPT | Performed by: SPECIALIST

## 2021-01-28 PROCEDURE — C1771 REP DEV, URINARY, W/SLING: HCPCS | Performed by: SPECIALIST

## 2021-01-28 PROCEDURE — 999N001017 HC STATISTIC GLUCOSE BY METER IP

## 2021-01-28 PROCEDURE — 0UT90ZZ RESECTION OF UTERUS, OPEN APPROACH: ICD-10-PCS | Performed by: SPECIALIST

## 2021-01-28 PROCEDURE — 710N000009 HC RECOVERY PHASE 1, LEVEL 1, PER MIN: Performed by: SPECIALIST

## 2021-01-28 PROCEDURE — 250N000009 HC RX 250: Performed by: NURSE ANESTHETIST, CERTIFIED REGISTERED

## 2021-01-28 PROCEDURE — 250N000009 HC RX 250: Performed by: ANESTHESIOLOGY

## 2021-01-28 PROCEDURE — 88307 TISSUE EXAM BY PATHOLOGIST: CPT | Mod: TC | Performed by: SPECIALIST

## 2021-01-28 PROCEDURE — 0DBW0ZZ EXCISION OF PERITONEUM, OPEN APPROACH: ICD-10-PCS | Performed by: SPECIALIST

## 2021-01-28 PROCEDURE — 0DTJ0ZZ RESECTION OF APPENDIX, OPEN APPROACH: ICD-10-PCS | Performed by: SURGERY

## 2021-01-28 PROCEDURE — 36415 COLL VENOUS BLD VENIPUNCTURE: CPT | Performed by: SPECIALIST

## 2021-01-28 DEVICE — MESH SLING ADVANTAGE FIT SYSTEM M0068502110: Type: IMPLANTABLE DEVICE | Site: VAGINA | Status: FUNCTIONAL

## 2021-01-28 RX ORDER — DEXAMETHASONE SODIUM PHOSPHATE 4 MG/ML
INJECTION, SOLUTION INTRA-ARTICULAR; INTRALESIONAL; INTRAMUSCULAR; INTRAVENOUS; SOFT TISSUE PRN
Status: DISCONTINUED | OUTPATIENT
Start: 2021-01-28 | End: 2021-01-28

## 2021-01-28 RX ORDER — MAGNESIUM HYDROXIDE 1200 MG/15ML
LIQUID ORAL PRN
Status: DISCONTINUED | OUTPATIENT
Start: 2021-01-28 | End: 2021-01-28 | Stop reason: HOSPADM

## 2021-01-28 RX ORDER — TRAZODONE HYDROCHLORIDE 50 MG/1
50 TABLET, FILM COATED ORAL AT BEDTIME
Status: DISCONTINUED | OUTPATIENT
Start: 2021-01-28 | End: 2021-01-31 | Stop reason: HOSPADM

## 2021-01-28 RX ORDER — ATOMOXETINE 60 MG/1
60 CAPSULE ORAL EVERY MORNING
Status: DISCONTINUED | OUTPATIENT
Start: 2021-01-29 | End: 2021-01-31 | Stop reason: HOSPADM

## 2021-01-28 RX ORDER — HYDROMORPHONE HYDROCHLORIDE 1 MG/ML
.3-.5 INJECTION, SOLUTION INTRAMUSCULAR; INTRAVENOUS; SUBCUTANEOUS
Status: DISCONTINUED | OUTPATIENT
Start: 2021-01-28 | End: 2021-01-31 | Stop reason: HOSPADM

## 2021-01-28 RX ORDER — ACETAMINOPHEN 325 MG/1
975 TABLET ORAL EVERY 8 HOURS
Status: DISCONTINUED | OUTPATIENT
Start: 2021-01-28 | End: 2021-01-31 | Stop reason: HOSPADM

## 2021-01-28 RX ORDER — PROPOFOL 10 MG/ML
INJECTION, EMULSION INTRAVENOUS CONTINUOUS PRN
Status: DISCONTINUED | OUTPATIENT
Start: 2021-01-28 | End: 2021-01-28

## 2021-01-28 RX ORDER — SODIUM CHLORIDE, SODIUM LACTATE, POTASSIUM CHLORIDE, CALCIUM CHLORIDE 600; 310; 30; 20 MG/100ML; MG/100ML; MG/100ML; MG/100ML
INJECTION, SOLUTION INTRAVENOUS CONTINUOUS
Status: DISCONTINUED | OUTPATIENT
Start: 2021-01-28 | End: 2021-01-28 | Stop reason: HOSPADM

## 2021-01-28 RX ORDER — ONDANSETRON 2 MG/ML
4 INJECTION INTRAMUSCULAR; INTRAVENOUS EVERY 8 HOURS PRN
Status: DISCONTINUED | OUTPATIENT
Start: 2021-01-28 | End: 2021-01-31 | Stop reason: HOSPADM

## 2021-01-28 RX ORDER — ONDANSETRON 4 MG/1
4 TABLET, ORALLY DISINTEGRATING ORAL EVERY 8 HOURS PRN
Status: DISCONTINUED | OUTPATIENT
Start: 2021-01-28 | End: 2021-01-31 | Stop reason: HOSPADM

## 2021-01-28 RX ORDER — NALOXONE HYDROCHLORIDE 0.4 MG/ML
0.4 INJECTION, SOLUTION INTRAMUSCULAR; INTRAVENOUS; SUBCUTANEOUS
Status: ACTIVE | OUTPATIENT
Start: 2021-01-28 | End: 2021-01-29

## 2021-01-28 RX ORDER — PROPOFOL 10 MG/ML
INJECTION, EMULSION INTRAVENOUS PRN
Status: DISCONTINUED | OUTPATIENT
Start: 2021-01-28 | End: 2021-01-28

## 2021-01-28 RX ORDER — ONDANSETRON 4 MG/1
4 TABLET, ORALLY DISINTEGRATING ORAL EVERY 30 MIN PRN
Status: DISCONTINUED | OUTPATIENT
Start: 2021-01-28 | End: 2021-01-28 | Stop reason: HOSPADM

## 2021-01-28 RX ORDER — HYDROXYZINE HYDROCHLORIDE 25 MG/1
50 TABLET, FILM COATED ORAL AT BEDTIME
Status: DISCONTINUED | OUTPATIENT
Start: 2021-01-28 | End: 2021-01-31 | Stop reason: HOSPADM

## 2021-01-28 RX ORDER — DOCUSATE SODIUM 100 MG/1
100 CAPSULE, LIQUID FILLED ORAL 2 TIMES DAILY
Status: DISCONTINUED | OUTPATIENT
Start: 2021-01-28 | End: 2021-01-31 | Stop reason: HOSPADM

## 2021-01-28 RX ORDER — SODIUM CHLORIDE, SODIUM LACTATE, POTASSIUM CHLORIDE, CALCIUM CHLORIDE 600; 310; 30; 20 MG/100ML; MG/100ML; MG/100ML; MG/100ML
INJECTION, SOLUTION INTRAVENOUS CONTINUOUS
Status: DISCONTINUED | OUTPATIENT
Start: 2021-01-28 | End: 2021-01-31 | Stop reason: HOSPADM

## 2021-01-28 RX ORDER — FAMOTIDINE 20 MG/1
20 TABLET, FILM COATED ORAL 2 TIMES DAILY
Status: DISCONTINUED | OUTPATIENT
Start: 2021-01-28 | End: 2021-01-31 | Stop reason: HOSPADM

## 2021-01-28 RX ORDER — ACETAMINOPHEN 325 MG/1
975 TABLET ORAL ONCE
Status: COMPLETED | OUTPATIENT
Start: 2021-01-28 | End: 2021-01-28

## 2021-01-28 RX ORDER — ONDANSETRON 2 MG/ML
INJECTION INTRAMUSCULAR; INTRAVENOUS PRN
Status: DISCONTINUED | OUTPATIENT
Start: 2021-01-28 | End: 2021-01-28

## 2021-01-28 RX ORDER — LIDOCAINE HYDROCHLORIDE 20 MG/ML
INJECTION, SOLUTION INFILTRATION; PERINEURAL PRN
Status: DISCONTINUED | OUTPATIENT
Start: 2021-01-28 | End: 2021-01-28

## 2021-01-28 RX ORDER — ACETAMINOPHEN 325 MG/1
650 TABLET ORAL EVERY 4 HOURS PRN
Status: DISCONTINUED | OUTPATIENT
Start: 2021-01-31 | End: 2021-01-31 | Stop reason: HOSPADM

## 2021-01-28 RX ORDER — BUPROPION HYDROCHLORIDE 300 MG/1
300 TABLET ORAL EVERY MORNING
Status: DISCONTINUED | OUTPATIENT
Start: 2021-01-29 | End: 2021-01-31 | Stop reason: HOSPADM

## 2021-01-28 RX ORDER — HYDROCHLOROTHIAZIDE 25 MG/1
25 TABLET ORAL EVERY MORNING
Status: DISCONTINUED | OUTPATIENT
Start: 2021-01-29 | End: 2021-01-31 | Stop reason: HOSPADM

## 2021-01-28 RX ORDER — HYDROMORPHONE HYDROCHLORIDE 1 MG/ML
.3-.5 INJECTION, SOLUTION INTRAMUSCULAR; INTRAVENOUS; SUBCUTANEOUS EVERY 5 MIN PRN
Status: DISCONTINUED | OUTPATIENT
Start: 2021-01-28 | End: 2021-01-28 | Stop reason: HOSPADM

## 2021-01-28 RX ORDER — ONDANSETRON 2 MG/ML
4 INJECTION INTRAMUSCULAR; INTRAVENOUS EVERY 30 MIN PRN
Status: DISCONTINUED | OUTPATIENT
Start: 2021-01-28 | End: 2021-01-28 | Stop reason: HOSPADM

## 2021-01-28 RX ORDER — FENTANYL CITRATE 50 UG/ML
25-50 INJECTION, SOLUTION INTRAMUSCULAR; INTRAVENOUS
Status: DISCONTINUED | OUTPATIENT
Start: 2021-01-28 | End: 2021-01-28 | Stop reason: HOSPADM

## 2021-01-28 RX ORDER — KETOROLAC TROMETHAMINE 30 MG/ML
30 INJECTION, SOLUTION INTRAMUSCULAR; INTRAVENOUS EVERY 6 HOURS PRN
Status: DISCONTINUED | OUTPATIENT
Start: 2021-01-28 | End: 2021-01-31 | Stop reason: HOSPADM

## 2021-01-28 RX ORDER — LOSARTAN POTASSIUM 25 MG/1
25 TABLET ORAL EVERY MORNING
Status: DISCONTINUED | OUTPATIENT
Start: 2021-01-29 | End: 2021-01-31 | Stop reason: HOSPADM

## 2021-01-28 RX ORDER — IBUPROFEN 600 MG/1
600 TABLET, FILM COATED ORAL EVERY 6 HOURS PRN
Status: DISCONTINUED | OUTPATIENT
Start: 2021-01-28 | End: 2021-01-31 | Stop reason: HOSPADM

## 2021-01-28 RX ORDER — FENTANYL CITRATE 50 UG/ML
INJECTION, SOLUTION INTRAMUSCULAR; INTRAVENOUS PRN
Status: DISCONTINUED | OUTPATIENT
Start: 2021-01-28 | End: 2021-01-28

## 2021-01-28 RX ORDER — OXYCODONE HYDROCHLORIDE 5 MG/1
5-10 TABLET ORAL
Status: DISCONTINUED | OUTPATIENT
Start: 2021-01-28 | End: 2021-01-31 | Stop reason: HOSPADM

## 2021-01-28 RX ORDER — BUPIVACAINE HYDROCHLORIDE 5 MG/ML
INJECTION, SOLUTION EPIDURAL; INTRACAUDAL PRN
Status: DISCONTINUED | OUTPATIENT
Start: 2021-01-28 | End: 2021-01-28 | Stop reason: HOSPADM

## 2021-01-28 RX ORDER — SODIUM CHLORIDE, SODIUM LACTATE, POTASSIUM CHLORIDE, CALCIUM CHLORIDE 600; 310; 30; 20 MG/100ML; MG/100ML; MG/100ML; MG/100ML
INJECTION, SOLUTION INTRAVENOUS CONTINUOUS
Status: DISCONTINUED | OUTPATIENT
Start: 2021-01-28 | End: 2021-01-28

## 2021-01-28 RX ORDER — CEFAZOLIN SODIUM 2 G/100ML
2 INJECTION, SOLUTION INTRAVENOUS
Status: COMPLETED | OUTPATIENT
Start: 2021-01-28 | End: 2021-01-28

## 2021-01-28 RX ORDER — NALOXONE HYDROCHLORIDE 0.4 MG/ML
0.2 INJECTION, SOLUTION INTRAMUSCULAR; INTRAVENOUS; SUBCUTANEOUS
Status: ACTIVE | OUTPATIENT
Start: 2021-01-28 | End: 2021-01-29

## 2021-01-28 RX ORDER — LIDOCAINE 40 MG/G
CREAM TOPICAL
Status: DISCONTINUED | OUTPATIENT
Start: 2021-01-28 | End: 2021-01-31 | Stop reason: HOSPADM

## 2021-01-28 RX ORDER — DULOXETIN HYDROCHLORIDE 60 MG/1
60 CAPSULE, DELAYED RELEASE ORAL EVERY EVENING
Status: DISCONTINUED | OUTPATIENT
Start: 2021-01-28 | End: 2021-01-31 | Stop reason: HOSPADM

## 2021-01-28 RX ORDER — CEFAZOLIN SODIUM 1 G/3ML
1 INJECTION, POWDER, FOR SOLUTION INTRAMUSCULAR; INTRAVENOUS SEE ADMIN INSTRUCTIONS
Status: DISCONTINUED | OUTPATIENT
Start: 2021-01-28 | End: 2021-01-28

## 2021-01-28 RX ADMIN — PROPOFOL 20 MG: 10 INJECTION, EMULSION INTRAVENOUS at 13:44

## 2021-01-28 RX ADMIN — LIDOCAINE HYDROCHLORIDE 100 MG: 20 INJECTION, SOLUTION INFILTRATION; PERINEURAL at 12:39

## 2021-01-28 RX ADMIN — ONDANSETRON 4 MG: 2 INJECTION INTRAMUSCULAR; INTRAVENOUS at 14:55

## 2021-01-28 RX ADMIN — SODIUM CHLORIDE, POTASSIUM CHLORIDE, SODIUM LACTATE AND CALCIUM CHLORIDE: 600; 310; 30; 20 INJECTION, SOLUTION INTRAVENOUS at 12:32

## 2021-01-28 RX ADMIN — TRAZODONE HYDROCHLORIDE 50 MG: 50 TABLET ORAL at 21:27

## 2021-01-28 RX ADMIN — DOCUSATE SODIUM 100 MG: 100 CAPSULE, LIQUID FILLED ORAL at 20:00

## 2021-01-28 RX ADMIN — DEXMEDETOMIDINE HYDROCHLORIDE 20 MCG: 100 INJECTION, SOLUTION INTRAVENOUS at 13:00

## 2021-01-28 RX ADMIN — HYDROXYZINE HYDROCHLORIDE 50 MG: 25 TABLET, FILM COATED ORAL at 21:27

## 2021-01-28 RX ADMIN — HYDROMORPHONE HYDROCHLORIDE 0.4 MG: 1 INJECTION, SOLUTION INTRAMUSCULAR; INTRAVENOUS; SUBCUTANEOUS at 20:00

## 2021-01-28 RX ADMIN — ROCURONIUM BROMIDE 10 MG: 10 INJECTION INTRAVENOUS at 13:32

## 2021-01-28 RX ADMIN — MIDAZOLAM 2 MG: 1 INJECTION INTRAMUSCULAR; INTRAVENOUS at 12:34

## 2021-01-28 RX ADMIN — ACETAMINOPHEN 975 MG: 325 TABLET, FILM COATED ORAL at 11:03

## 2021-01-28 RX ADMIN — FENTANYL CITRATE 50 MCG: 50 INJECTION, SOLUTION INTRAMUSCULAR; INTRAVENOUS at 12:57

## 2021-01-28 RX ADMIN — DEXAMETHASONE SODIUM PHOSPHATE 4 MG: 4 INJECTION, SOLUTION INTRA-ARTICULAR; INTRALESIONAL; INTRAMUSCULAR; INTRAVENOUS; SOFT TISSUE at 12:51

## 2021-01-28 RX ADMIN — HYDROMORPHONE HYDROCHLORIDE 0.3 MG: 1 INJECTION, SOLUTION INTRAMUSCULAR; INTRAVENOUS; SUBCUTANEOUS at 17:59

## 2021-01-28 RX ADMIN — KETOROLAC TROMETHAMINE 30 MG: 30 INJECTION, SOLUTION INTRAMUSCULAR at 23:46

## 2021-01-28 RX ADMIN — ROCURONIUM BROMIDE 10 MG: 10 INJECTION INTRAVENOUS at 14:04

## 2021-01-28 RX ADMIN — FENTANYL CITRATE 50 MCG: 50 INJECTION, SOLUTION INTRAMUSCULAR; INTRAVENOUS at 12:39

## 2021-01-28 RX ADMIN — FENTANYL CITRATE 50 MCG: 0.05 INJECTION, SOLUTION INTRAMUSCULAR; INTRAVENOUS at 15:49

## 2021-01-28 RX ADMIN — SODIUM CHLORIDE, POTASSIUM CHLORIDE, SODIUM LACTATE AND CALCIUM CHLORIDE: 600; 310; 30; 20 INJECTION, SOLUTION INTRAVENOUS at 12:05

## 2021-01-28 RX ADMIN — DULOXETINE HYDROCHLORIDE 60 MG: 60 CAPSULE, DELAYED RELEASE ORAL at 20:00

## 2021-01-28 RX ADMIN — HYDROMORPHONE HYDROCHLORIDE 0.3 MG: 1 INJECTION, SOLUTION INTRAMUSCULAR; INTRAVENOUS; SUBCUTANEOUS at 16:25

## 2021-01-28 RX ADMIN — CEFAZOLIN SODIUM 2 G: 2 INJECTION, SOLUTION INTRAVENOUS at 12:45

## 2021-01-28 RX ADMIN — FAMOTIDINE 20 MG: 10 INJECTION, SOLUTION INTRAVENOUS at 20:01

## 2021-01-28 RX ADMIN — SODIUM CHLORIDE, POTASSIUM CHLORIDE, SODIUM LACTATE AND CALCIUM CHLORIDE: 600; 310; 30; 20 INJECTION, SOLUTION INTRAVENOUS at 14:43

## 2021-01-28 RX ADMIN — PROPOFOL 20 MG: 10 INJECTION, EMULSION INTRAVENOUS at 14:00

## 2021-01-28 RX ADMIN — ACETAMINOPHEN 975 MG: 325 TABLET, FILM COATED ORAL at 21:25

## 2021-01-28 RX ADMIN — PROPOFOL 200 MG: 10 INJECTION, EMULSION INTRAVENOUS at 12:39

## 2021-01-28 RX ADMIN — CEFAZOLIN SODIUM 1 G: 2 INJECTION, SOLUTION INTRAVENOUS at 14:43

## 2021-01-28 RX ADMIN — HYDROMORPHONE HYDROCHLORIDE 0.5 MG: 1 INJECTION, SOLUTION INTRAMUSCULAR; INTRAVENOUS; SUBCUTANEOUS at 13:33

## 2021-01-28 RX ADMIN — SUGAMMADEX 200 MG: 100 INJECTION, SOLUTION INTRAVENOUS at 15:16

## 2021-01-28 RX ADMIN — OXYCODONE HYDROCHLORIDE 5 MG: 5 TABLET ORAL at 22:20

## 2021-01-28 RX ADMIN — SODIUM CHLORIDE, POTASSIUM CHLORIDE, SODIUM LACTATE AND CALCIUM CHLORIDE: 600; 310; 30; 20 INJECTION, SOLUTION INTRAVENOUS at 23:48

## 2021-01-28 RX ADMIN — LIDOCAINE HYDROCHLORIDE 0.3 ML: 10 INJECTION, SOLUTION EPIDURAL; INFILTRATION; INTRACAUDAL; PERINEURAL at 12:05

## 2021-01-28 RX ADMIN — PROPOFOL 30 MCG/KG/MIN: 10 INJECTION, EMULSION INTRAVENOUS at 12:43

## 2021-01-28 RX ADMIN — ROCURONIUM BROMIDE 10 MG: 10 INJECTION INTRAVENOUS at 12:57

## 2021-01-28 RX ADMIN — ROCURONIUM BROMIDE 40 MG: 10 INJECTION INTRAVENOUS at 12:40

## 2021-01-28 RX ADMIN — PROPOFOL 20 MG: 10 INJECTION, EMULSION INTRAVENOUS at 13:27

## 2021-01-28 ASSESSMENT — ACTIVITIES OF DAILY LIVING (ADL): ADLS_ACUITY_SCORE: 14

## 2021-01-28 ASSESSMENT — MIFFLIN-ST. JEOR: SCORE: 1356.12

## 2021-01-28 NOTE — PROCEDURES
I was present and scrubbed for this total abdominal hysterectomy/bilateral salpingoopherectomy/ appendectomy.  This case was made more difficult due to the presence of adhesions and the body habitus of the patient, as well as the large size of the uterus.  My assistance was necessary for retraction/ visualization/ and removal of the uterus/ovaries/ Fallopian tubes.    Ivet Lord MD ....................  1/28/2021   5:41 PM , pager: [phone]

## 2021-01-28 NOTE — ANESTHESIA PREPROCEDURE EVALUATION
Anesthesia Pre-Procedure Evaluation    Patient: Jenni Anderson   MRN: [id number] : 1965        Preoperative Diagnosis: Dysfunctional uterine bleeding [N93.8]  Leiomyoma of uterus, unspecified [D25.9]  Female stress incontinence [N39.3]   Procedure : Procedure(s):  TOTAL ABDOMINAL HYSTERECTOMY WITH BILATERAL  SALPINGO-OOPHORECTOMY  RETROPUBIC  MIDURETHRAL SLING  AND CYSTOSCOPY     Past Medical History:   Diagnosis Date     Abnormal maternal glucose tolerance, complicating pregnancy, childbirth, or the puerperium, unspecified as to episode of care      Allergic rhinitis, cause unspecified     Allergic rhinitis     Anxiety      Attention deficit disorder without mention of hyperactivity     psychiatrist     Breast cancer (H)     left     Depression      Essential hypertension      Insomnia, unspecified      Obesity      Type 2 diabetes mellitus without complication (H)       Past Surgical History:   Procedure Laterality Date     BIOPSY      sentinel node biopsy     BREAST SURGERY Left 2020    partial mastectomy     C  DELIVERY ONLY  ,     , Low Cervical      No Known Allergies   Social History     Tobacco Use     Smoking status: Never Smoker     Smokeless tobacco: Never Used   Substance Use Topics     Alcohol use: Yes     Comment: rare 1 every few months      Wt Readings from Last 1 Encounters:   10/19/19 79.4 kg (175 lb)        Anesthesia Evaluation   Pt has had prior anesthetic.     No history of anesthetic complications       ROS/MED HX  ENT/Pulmonary: Comment: TMJ synd     (+) allergic rhinitis,  (-) sleep apnea   Neurologic: Comment: ADD    (+) migraines,     Cardiovascular:     (+) hypertension-----    METS/Exercise Tolerance: >4 METS    Hematologic:       Musculoskeletal:       GI/Hepatic:    (-) GERD   Renal/Genitourinary:       Endo: Comment: DUB    (+) type II DM, Obesity,     Psychiatric/Substance Use:       Infectious Disease:       Malignancy:   (+) Malignancy,  History of Breast.    Other:            Physical Exam    Airway        Mallampati: II   TM distance: > 3 FB   Neck ROM: full   Mouth opening: > 3 cm    Respiratory Devices and Support         Dental  no notable dental history         Cardiovascular          Rhythm and rate: regular     Pulmonary           breath sounds clear to auscultation           OUTSIDE LABS:  CBC:   Lab Results   Component Value Date    WBC 11.0 07/12/2019    WBC 9.1 05/09/2005    HGB 14.2 07/12/2019    HGB 14.3 05/18/2007    HCT 40.7 07/12/2019    HCT 40.4 05/09/2005     (H) 07/12/2019     05/09/2005     BMP:   Lab Results   Component Value Date     05/18/2007     06/15/2005    POTASSIUM 4.2 05/18/2007    POTASSIUM 4.4 06/15/2005    CHLORIDE 102 05/18/2007    CHLORIDE 104 06/15/2005    CO2 27 05/18/2007    CO2 26 06/15/2005    BUN 14 05/18/2007    BUN 15 06/15/2005    CR 0.70 05/18/2007    CR 0.70 06/15/2005    GLC 98 05/18/2007    GLC 95 06/15/2005     COAGS: No results found for: PTT, INR, FIBR  POC:   Lab Results   Component Value Date    HCGS Negative 07/12/2019     HEPATIC:   Lab Results   Component Value Date    ALBUMIN 4.0 06/15/2005    PROTTOTAL 7.6 06/15/2005    ALT 13 06/15/2005    AST 25 06/15/2005    ALKPHOS 69 06/15/2005    BILITOTAL 0.8 06/15/2005     OTHER:   Lab Results   Component Value Date    TRUONG 8.8 05/18/2007    TSH 1.83 12/28/2007    CRP 6.4 05/18/2007     Allergies   Allergen Reactions     Iodine Rash     Social History     Tobacco Use     Smoking status: Never Smoker     Smokeless tobacco: Never Used   Substance Use Topics     Alcohol use: Yes     Comment: rare 1 every few months     Wt Readings from Last 1 Encounters:   01/28/21 83.5 kg (184 lb)      Prior to Admission medications    Medication Sig Start Date End Date Taking? Authorizing Provider   atomoxetine (STRATTERA) 60 MG capsule Take 60 mg by mouth every morning   Yes Reported, Patient   Bioflavonoid Products (BIOFLEX PO) Take 1 Dose  "by mouth daily   Yes Reported, Patient   buPROPion (WELLBUTRIN XL) 300 MG 24 hr tablet Take 300 mg by mouth every morning   Yes Reported, Patient   co-enzyme Q-10 100 MG CAPS capsule Take 100 mg by mouth daily   Yes Reported, Patient   DULoxetine (CYMBALTA) 60 MG capsule Take 60 mg by mouth every evening   Yes Reported, Patient   hydrochlorothiazide (HYDRODIURIL) 25 MG tablet Take 25 mg by mouth every morning    Yes Reported, Patient   hydrOXYzine (ATARAX) 25 MG tablet Take 50 mg by mouth At Bedtime (2 x 25mg)   Yes Reported, Patient   losartan (COZAAR) 25 MG tablet Take 25 mg by mouth every morning    Yes Reported, Patient   MAGNESIUM PO Take 1 Dose by mouth daily (OTC)   Yes Reported, Patient   MELATONIN PO Take 1 Dose by mouth At Bedtime   Yes Reported, Patient   Multiple Vitamin (MULTIVITAMIN PO) Take 1 tablet by mouth daily   Yes Reported, Patient   Nutritional Supplements (GRAPESEED EXTRACT PO) Take 1 tablet by mouth daily   Yes Reported, Patient   omega 3 1000 MG CAPS Take 1 Dose by mouth daily   Yes Reported, Patient   OVER-THE-COUNTER Take 1 Dose by mouth daily \"Phytoceramides\"   Yes Reported, Patient   traZODone (DESYREL) 50 MG tablet Take 50 mg by mouth At Bedtime   Yes Reported, Patient     Current Facility-Administered Medications Ordered in Epic   Medication Dose Route Frequency Last Rate Last Admin     ceFAZolin (ANCEF) 1 g vial to attach to  ml bag for ADULT or 50 ml bag for PEDS  1 g Intravenous See Admin Instructions         ceFAZolin (ANCEF) intermittent infusion 2 g in 100 mL dextrose PRE-MIX  2 g Intravenous Pre-Op/Pre-procedure x 1 dose         lactated ringers infusion   Intravenous Continuous         lidocaine 1 % 0.1-1 mL  0.1-1 mL Other Q1H PRN         No current [facility]-ordered outpatient medications on file.       lactated ringers       Recent Labs   Lab Test 01/28/21  1052   POTASSIUM 3.6   *     Recent Labs   Lab Test 01/28/21  1052 07/12/19  1617   WBC  --  11.0   HGB 13.4 " 14.2   PLT  --  489*     No results for input(s): ABO, RH in the last 21514 hours.  No results for input(s): TROPI in the last 06735 hours.  No results for input(s): PH, PCO2, PO2, HCO3 in the last 91286 hours.  No results for input(s): HCG in the last 95805 hours.  No results found for this or any previous visit (from the past 744 hour(s)).  No results found for this or any previous visit (from the past 4320 hour(s)).      RECENT LABS:       Anesthesia Plan     History & Physical Review      ASA Status:  2.   Plan for General         PONV prophylaxis:  Ondansetron (or other 5HT-3), Dexamethasone or Solumedrol and Background Propofol Infusion.       Consents  Anesthesia Plan(s) and associated risks, benefits, and realistic alternatives discussed.    Questions answered and patient/representative(s) expressed understanding.    Discussed with:  Patient.           Use of blood products discussed: Yes.   Discussed with: Patient.        Postoperative Care            Anisha Wilburn MD

## 2021-01-28 NOTE — OP NOTE
OP NOTE    Preop diagnosis:  1.Uterine fibroids  2.History of breast cancer    Postop diagnosis: Same + Abnormal appendix, Abnormal peritoneal tissue    Procedure: Total abdominal hysterectomy, Bilateral salpingo-oophorectomy, Removal of peritoneal tissue, Appendectomy    Physician: Ivet Unger    Anesthetic: GET    Findings: Large myomatous uterus. Normal tubes and ovaries. Decidual reaction-like tissue in anterior and posterior cul-de-sac. Adhesions of omentum to anterior abdominal wall in upper portion of incision. The appendix was erythematous, thickened and contained a fecalith.     Preoperative status: Jenni Anderson is a 55 year old who presents for abdominal hysterectomy with complaints of abnormal uterine bleeding, cramps and uterine fibroids. She has recently been diagnosed with breast cancer and desires risk-reduction oophorectomy. Risks and procedure for surgeryy were discussed with the patient and she desired to proceed.    Surgical procedure:  The patient was brought to the OR. She was induced under general anesthesia. The abdomen and vagina were prepped and draped in the usual manner. A catheter was placed into the bladder.  Time Out was performed.  A Pfannenstiel incision was performed. The subcutaneous tissue was bluntly and sharply dissected. The fascia was incised and extended in a curvilinear manner. The subfascial space was bluntly and sharply developed. The peritoneal cavity was entered. The omentum was identified and avoided during the opening of the peritoneum.  A self-retaining retractor was placed and the bowel packed out of the incision. The pelvis was examined with the findings as noted above.  The left round ligament was clamped, cut and ligated with 0 VIcryl. A peritoneal window was made and the infundibulopelvic vessels isolated, clamped cut and ligated. The broad ligament was clamped cut and ligated and the bladder flap developed.  The right round ligament was  clamped, cut and ligated. The IP vessels were clamped cut and ligated and the adnexa removed. The broad ligament was ligated.  The left and right uterine vessels were serially ligated. The bladder flap was further developed and the vagina opened. The specimen was removed. The cuff was closed with figure-of-X sutures of 0 Vicryl. The pelvis was irrigated, re-examined and hemostasis obtained. Surgicel powder was placed.  Dr. Fraser was called to assess the appendix and agreed that it should be removed. See  operative note.  A closing tray was used and new gowns and gloves donned. The pelvis was re-examined and an area of residual bleeding resolved. The subfascial space was examined and hemostasis obtained. The fascia was closed with 2 running sutures of 0 Vicryl. The subcutaneous layer was closed with a running suture of 2.0 Plain the skin was closed with patricia.  Dr. Pinto came in do her portion of the procedure    EBL: 250 cc    Pathology specimens: Uterus, cervix, tubes, ovaries, peritoneal tissue, appendix    Complications: Abdominal adhesions involving omentum

## 2021-01-28 NOTE — ANESTHESIA PROCEDURE NOTES
Airway   Date/Time: 1/28/2021 12:41 PM   Patient location during procedure: OR ([facility] - Operating Room or Procedural Area)  Staff -   Anesthesiologist:  Anisha Wilburn MD  CRNA: Warren Hernandez APRN CRNA  Performed By: CRNA    Consent for Airway   Urgency: elective    Indications and Patient Condition  Indications for airway management: gi-procedural  Induction type:intravenousMask difficulty assessment: 1 - vent by mask    Final Airway Details  Final airway type: endotracheal airway  Successful airway:ETT - single  Endotracheal Airway Details   ETT size (mm): 7.0  Cuffed: yes  Cuff volume (mL): 10  Successful intubation technique: video laryngoscopy  Grade View of Cords: 1  Adjucts: stylet  Measured from: lips  Secured at (cm): 21  Secured with: pink tape  Bite block used: None    Post intubation assessment   Number of attempts at approach: 1  Number of other approaches attempted: 0  Secured with:pink tape  Ease of procedure: easy  Dentition: Intact and Unchanged

## 2021-01-28 NOTE — ANESTHESIA CARE TRANSFER NOTE
Patient: Jenni Anderson    Procedure(s):  TOTAL ABDOMINAL HYSTERECTOMY WITH BILATERAL  SALPINGO-OOPHORECTOMY AND REMOVAL OF PERITONEAL TISSUE  RETROPUBIC  MIDURETHRAL SLING  AND CYSTOSCOPY  Appendectomy open    Diagnosis: Dysfunctional uterine bleeding [N93.8]  Leiomyoma of uterus, unspecified [D25.9]  Female stress incontinence [N39.3]  Diagnosis Additional Information: No value filed.    Anesthesia Type:   General     Note:    Oropharynx: oropharynx clear of all foreign objects and spontaneously breathing  Level of Consciousness: drowsy  Oxygen Supplementation: face mask  Level of Supplemental Oxygen: 6  Independent Airway: airway patency satisfactory and stable  Dentition: dentition unchanged  Vital Signs Stable: post-procedure vital signs reviewed and stable  Report to RN Given: handoff report given  Patient transferred to: PACU  Comments: Neuromuscular blockade reversed after TOF 4/4, spontaneous respirations, adequate tidal volumes, followed commands to voice, oropharynx suctioned with soft flexible catheter, extubated atraumatically, extubated with suction, airway patent after extubation.  Oxygen via facemask at 6 liters per minute to PACU. Oxygen tubing connected to wall O2 in PACU, SpO2, NiBP, and EKG monitors and alarms on and functioning, Keerthi Hugger warmer connected to patient gown, report on patient's clinical status given to PACU RN, RN questions answered.     Handoff Report: Identifed the Patient, Identified the Reponsible Provider, Reviewed the pertinent medical history, Discussed the surgical course, Reviewed Intra-OP anesthesia mangement and issues during anesthesia, Set expectations for post-procedure period and Allowed opportunity for questions and acknowledgement of understanding      Vitals: (Last set prior to Anesthesia Care Transfer)  CRNA VITALS  1/28/2021 1501 - 1/28/2021 1538      1/28/2021             NIBP:  (!) 158/94    NIBP Mean:  118        Electronically Signed By: Warren  REINALDO Hernandez CRNA  January 28, 2021  3:38 PM

## 2021-01-28 NOTE — ANESTHESIA POSTPROCEDURE EVALUATION
Patient: Jenni Anderson    Procedure(s):  TOTAL ABDOMINAL HYSTERECTOMY WITH BILATERAL  SALPINGO-OOPHORECTOMY AND REMOVAL OF PERITONEAL TISSUE  RETROPUBIC  MIDURETHRAL SLING  AND CYSTOSCOPY  Appendectomy open    Diagnosis:Dysfunctional uterine bleeding [N93.8]  Leiomyoma of uterus, unspecified [D25.9]  Female stress incontinence [N39.3]  Diagnosis Additional Information: No value filed.    Anesthesia Type:  General    Note:     Postop Pain Control: Uneventful            Sign Out: Well controlled pain   PONV: No   Neuro/Psych: Uneventful            Sign Out: Acceptable/Baseline neuro status   Airway/Respiratory: Uneventful            Sign Out: Acceptable/Baseline resp. status   CV/Hemodynamics: Uneventful            Sign Out: Acceptable CV status   Other NRE: NONE   DID A NON-ROUTINE EVENT OCCUR? No         Last vitals:  Vitals:    01/28/21 1700 01/28/21 1710 01/28/21 1720   BP: (!) 163/90 (!) 155/94    Pulse: 92 94    Resp: 9 16    Temp: 36.3  C (97.3  F) 36.2  C (97.2  F)    SpO2: 99% 98% 99%       Electronically Signed By: Anisha Wilburn MD  January 28, 2021  5:42 PM

## 2021-01-28 NOTE — OP NOTE
"OPERATIVE REPORT    PATIENT: Jenni \"Christel\" Monica  MR# [id number]  : 1965    DATE OF OPERATION: 2021    PREOPERATIVE DIAGNOSES:  1. Stress urinary incontinence.    POSTOPERATIVE DIAGNOSES:  1. Stress urinary incontinence.    PROCEDURES:  1. Retropubic Midurethral Sling. ([employer] FIT)  2. Cystourethroscopy.    SURGEON: Isabel Pinto MD     ANESTHESIA:    General  ESTIMATED BLOOD LOSS:    25 ml (275ml total for entire operative episode)  IV FLUIDS:    1000 ml of crystalloid for entire operative episode.  COMPLICATIONS:    None.  DRAINS:    16-Yemeni Armendariz catheter to gravity drainage.  FINDINGS:    The bladder was found to be free of lesion. Ureters were in their normal anatomic position. Both ureters were noted to be patent to urine flow. The urethra was normal appearing.    INDICATIONS:  This patient was seen in consultation regarding urinary incontinence. Please refer to her clinic documentation for a complete description of her evaluation and treatment plan. She was desirous of a definitive surgical approach. Prior to the procedure, the risks, benefits, indications, and alternatives were discussed. Written and verbal consent were obtained.    PROCEDURE IN DETAIL:  Pt was brought to the operating suite. She was administered prophylactic IV antibiotics and had sequential compression devices present on her lower extremities.  She was administered anesthesia, then underwent abdominal/laparotomy approach surgery with Dr. Vang (dictated separately). She was then repositioned into dorsal lithotomy position with her legs carefully stationed in [place] stirrups with attention to avoiding pressure points. She was now sterilely prepped and draped in usual fashion.  Her bladder was drained with an 18-Yemeni Armendariz catheter.    Retropubic Midurethral Sling.  Two marks were created on the anterior abdominal wall 2.5 cm lateral to midline at the level of the pubic bone. Local " anesthetic was injected into the subcutaneous tissue below the marks. Attention was turned to the vagina, where an Allis clamp was applied 1 cm distal from the meatus, an additional Allis clamp 2.5 cm from the meatus. The periurethral tissue was injected with a solution of Marcaine with epinephrine.  A 1.5 cm incision was created between the 2 Allis clamps beneath the mid urethra in the sagittal plane. Two periurethral tunnels were then created out laterally towards the pubic bone. Once an adequate dissection had been performed, the ElectroJet Advantage Fit device was selected. The trocar was brought through the patient's left periurethral tunnel back behind the pubic bone, through the space of Retzius, and out through the previously created tere on the anterior abdominal wall.  This was repeated in a similar fashion on the patient's right side.     Cystourethroscopy was now performed with the above-noted normal findings. The cystoscope was removed. The trocars were brought through the skin incisions, thus positioning the sling mesh in a U configuration under the urethra. The sling material was appropriately positioned beneath the mid urethra with no overt tension. The resultant incision was closed with a running locking suture of 2-0 Vicryl.    The vagina was packed with a saline-soaked vaginal packing. Excess sling material on the anterior abdominal wall was trimmed. The resultant 2 mm incisions were closed with Dermabond. Sponge, lap, and needle counts were found to be correct. There were no complications from surgery.    Patient was awoken from anesthesia and brought to the recovery room in excellent condition.      Isabel Pinto MD

## 2021-01-28 NOTE — OP NOTE
PREOPERATIVE DIAGNOSIS: Abnormal appendix    POSTOPERATIVE DIAGNOSIS: Abnormal appendix    PROCEDURE: Appendectomy.     SURGEON: Sola Linton MD     ASSISTANT: NONE    ANESTHESIA: GET.     COMPLICATIONS: None.     BLOOD LOSS: Minimal.     FINDINGS: Enlarged appendix with likely fecallith     INDICATIONS: Mrs. Anderson presented for hysterectomy per Dr. Vang. During the procedure it appeared she had an abnormally enlarged and firm appendix. I was asked to remove the appendix.   .   DETAILS OF PROCEDURE: The patient was under general anesthesia with a lower open incision. The appendix was found in the right lower quadrant. It was enlarged and an obvious firm mass was palpated within the appendix. It was mildly inflamed but not perforated.  The base of the cecum and terminal ileum were normal. A mesenteric window was created at the base of the cecum.  A MARTINA blue load was fired across this area without issues. Once this was done, the appendix was freed of the lateral wall with cautery. The appendiceal artery was delineated with cautery. The appendiceal artery was ligated with a 2.0 Vicryl. No bleeding occurred. The appendix was removed. The area was explored. Staple lines were completely intact. There was no bleeding whatsoever. All sponge, instrument, and needle counts were correct at the conclusion of the procedure. Please refer to Dr. Vang's operative report regarding further details of the completion of the procedure.     SOLA LINTON MD     Please CC to PCP

## 2021-01-29 LAB
GLUCOSE SERPL-MCNC: 166 MG/DL (ref 70–99)
HGB BLD-MCNC: 12.9 G/DL (ref 11.7–15.7)
PLATELET # BLD AUTO: 472 10E9/L (ref 150–450)

## 2021-01-29 PROCEDURE — 82947 ASSAY GLUCOSE BLOOD QUANT: CPT | Performed by: SPECIALIST

## 2021-01-29 PROCEDURE — 36415 COLL VENOUS BLD VENIPUNCTURE: CPT | Performed by: SPECIALIST

## 2021-01-29 PROCEDURE — 258N000003 HC RX IP 258 OP 636: Performed by: SPECIALIST

## 2021-01-29 PROCEDURE — 250N000011 HC RX IP 250 OP 636: Performed by: SPECIALIST

## 2021-01-29 PROCEDURE — 250N000013 HC RX MED GY IP 250 OP 250 PS 637: Performed by: SPECIALIST

## 2021-01-29 PROCEDURE — 85049 AUTOMATED PLATELET COUNT: CPT | Performed by: SPECIALIST

## 2021-01-29 PROCEDURE — 85018 HEMOGLOBIN: CPT | Performed by: SPECIALIST

## 2021-01-29 PROCEDURE — 120N000001 HC R&B MED SURG/OB

## 2021-01-29 PROCEDURE — 250N000011 HC RX IP 250 OP 636: Performed by: OBSTETRICS & GYNECOLOGY

## 2021-01-29 RX ORDER — CIPROFLOXACIN 250 MG/1
250 TABLET, FILM COATED ORAL 2 TIMES DAILY
Qty: 10 TABLET | Refills: 0 | Status: SHIPPED | OUTPATIENT
Start: 2021-01-29

## 2021-01-29 RX ADMIN — ACETAMINOPHEN 975 MG: 325 TABLET, FILM COATED ORAL at 21:33

## 2021-01-29 RX ADMIN — IBUPROFEN 600 MG: 600 TABLET, FILM COATED ORAL at 18:01

## 2021-01-29 RX ADMIN — DOCUSATE SODIUM 100 MG: 100 CAPSULE, LIQUID FILLED ORAL at 21:33

## 2021-01-29 RX ADMIN — HYDROXYZINE HYDROCHLORIDE 50 MG: 25 TABLET, FILM COATED ORAL at 21:34

## 2021-01-29 RX ADMIN — DOCUSATE SODIUM 100 MG: 100 CAPSULE, LIQUID FILLED ORAL at 08:39

## 2021-01-29 RX ADMIN — SODIUM CHLORIDE, POTASSIUM CHLORIDE, SODIUM LACTATE AND CALCIUM CHLORIDE: 600; 310; 30; 20 INJECTION, SOLUTION INTRAVENOUS at 07:57

## 2021-01-29 RX ADMIN — ONDANSETRON 4 MG: 4 TABLET, ORALLY DISINTEGRATING ORAL at 02:11

## 2021-01-29 RX ADMIN — OXYCODONE HYDROCHLORIDE 5 MG: 5 TABLET ORAL at 02:11

## 2021-01-29 RX ADMIN — FAMOTIDINE 20 MG: 20 TABLET ORAL at 21:34

## 2021-01-29 RX ADMIN — IBUPROFEN 600 MG: 600 TABLET, FILM COATED ORAL at 11:03

## 2021-01-29 RX ADMIN — ENOXAPARIN SODIUM 40 MG: 40 INJECTION SUBCUTANEOUS at 10:35

## 2021-01-29 RX ADMIN — SODIUM CHLORIDE, POTASSIUM CHLORIDE, SODIUM LACTATE AND CALCIUM CHLORIDE: 600; 310; 30; 20 INJECTION, SOLUTION INTRAVENOUS at 23:56

## 2021-01-29 RX ADMIN — HYDROCHLOROTHIAZIDE 25 MG: 25 TABLET ORAL at 08:39

## 2021-01-29 RX ADMIN — ATOMOXETINE 60 MG: 60 CAPSULE ORAL at 08:38

## 2021-01-29 RX ADMIN — DULOXETINE HYDROCHLORIDE 60 MG: 60 CAPSULE, DELAYED RELEASE ORAL at 21:33

## 2021-01-29 RX ADMIN — IBUPROFEN 600 MG: 600 TABLET, FILM COATED ORAL at 23:56

## 2021-01-29 RX ADMIN — ACETAMINOPHEN 975 MG: 325 TABLET, FILM COATED ORAL at 05:44

## 2021-01-29 RX ADMIN — ACETAMINOPHEN 975 MG: 325 TABLET, FILM COATED ORAL at 13:42

## 2021-01-29 RX ADMIN — BUPROPION HYDROCHLORIDE 300 MG: 300 TABLET, EXTENDED RELEASE ORAL at 08:39

## 2021-01-29 RX ADMIN — SODIUM CHLORIDE, POTASSIUM CHLORIDE, SODIUM LACTATE AND CALCIUM CHLORIDE: 600; 310; 30; 20 INJECTION, SOLUTION INTRAVENOUS at 15:55

## 2021-01-29 RX ADMIN — TRAZODONE HYDROCHLORIDE 50 MG: 50 TABLET ORAL at 21:34

## 2021-01-29 RX ADMIN — FAMOTIDINE 20 MG: 20 TABLET ORAL at 08:39

## 2021-01-29 RX ADMIN — LOSARTAN POTASSIUM 25 MG: 25 TABLET, FILM COATED ORAL at 08:38

## 2021-01-29 ASSESSMENT — ACTIVITIES OF DAILY LIVING (ADL)
ADLS_ACUITY_SCORE: 16
ADLS_ACUITY_SCORE: 15
ADLS_ACUITY_SCORE: 16
ADLS_ACUITY_SCORE: 15
ADLS_ACUITY_SCORE: 15
ADLS_ACUITY_SCORE: 16

## 2021-01-29 ASSESSMENT — MIFFLIN-ST. JEOR: SCORE: 1343.87

## 2021-01-29 NOTE — PROGRESS NOTES
[facility]   Post-Op / Progress Note    Active Problems:    Abnormal uterine bleeding    Intramural leiomyoma of uterus    S/P abdominal hysterectomy       Subjective:  Patient reports doing well. Pain controlled.  Has not advanced diet, though would like to do so. She stood up early this morning, but has not yet gone for a walk. The stout is patent and still in place. Pt denies fever, chills, chest pain, shortness of breath.    Objective:  Patient Vitals for the past 8 hrs:   BP Temp Temp src Pulse Resp SpO2   21 0734 120/63 97.6  F (36.4  C) Oral 94 18 96 %   21 0547 115/64 -- -- 100 19 98 %     I/O last 3 completed shifts:  In: 1150 [I.V.:1150]  Out: 2380 [Urine:2105; Blood:275]  Recent Labs   Lab Test 21  0833 21  1052 19  1617   HGB 12.9 13.4 14.2       Gen: NAD, lying in bed  Abd: soft, non-tender,   Incision: covered, no drainage noted  Extrem: No edema, non-tender, SCDs in place    Assessment/Plan: 55 year old  POD#1 from total abdominal hysterectomy, appendectomy, retropubic midurethral sling and cystourethroscopy, recovering well  -routine post-operative care: ADAT, PO pain medications, encourage ambulation  -Keep stout in place for now. I spoke to Dr. Pinto, she will come by in the afternoon to see her today. Plan for voiding trial early tomorrow morning.  -ppx: SCDs, ambulation  -dispo: likely tomorrow.    Raisa Waldrop PA-C on 2021 at 9:15 AM    Dr. Anderson is the MD on call today and tomorrow.   Pager #[phone]

## 2021-01-29 NOTE — PROGRESS NOTES
UROGYNECOLOGY    POST-OP DAY #1    S: Doing well  Ambulating: UP   Diet: ADVANCING  Flatus: YES  Pain control: ADEQUATE  Vaginal Pack: REMOVED NOW  Yoo catheter: IN PLACE    O:  Vitals: /63   Pulse 94   Temp 97.6  F (36.4  C) (Oral)   Resp 18   Ht 1.524 m (5')   Wt 83.5 kg (184 lb)   SpO2 96%   BMI 35.94 kg/m    BMI= Body mass index is 35.94 kg/m .      Intake/Output Summary (Last 24 hours) at 1/29/2021 1448  Last data filed at 1/29/2021 1400  Gross per 24 hour   Intake 1887 ml   Output 3575 ml   Net -1688 ml       Appears healthy and well, A&O x3  Abdomen is soft, slight bloating, incisions C/D/I, good BS  Ext SCD, no edema  Vaginal packing removed now, no perineal edema, incisions intact.    HGB:  pre-op 13.4   Post-op 12.9    POD#1  A) Post-Operative Care:    ambulate     ADAT    continue with pain control strategies.    perform voiding trial IN AM    I reviewed post-operative instructions and precautions/ written information provided.    Discharge home PER DR MAURICIO/[facility]    Follow-up  DEPENDING ON OUTCOME OF VOIDING TRIAL. IF PASSES VT, F/U IN OFFICE IN 2 WEEKS. IF DISCHARGED HOME WITH YOO CATHETER, I WILL CALL PATIENT TO ARRANGE A F/U VISIT FOR CATHETER REMOVAL EARLY NEXT WEEK  B) Medical   -NO ACTIVE ISSUES    Isabel Pinto MD

## 2021-01-29 NOTE — PLAN OF CARE
A/O x 4, lethargic, sleeps between cares.  VSS on 2 L O2 via nasal canula. Rates lower abdominal/incisional pain 6/10.  Dilaudid given x 1. Clear liquids, taking ice chips. Armendariz catheter in place, clear, yellow urine output. BS not audible.  Blood sugar 214 down in PACU.  Diaphoretic.  Lower abdominal transverse incision, UTV.  Dressing CDI.  Discharge pending recovery.

## 2021-01-29 NOTE — PLAN OF CARE
Pt A/O. Lethargic, easily arouses to voice. VSS on RA. Lower abd/incisional pain controlled w/ Dilaudid x1, Oxy x2, Toradol x1. Ice applied to incision. Abdominal binder in place. Incision dressing CDI. C/o nausea, Zofran given x1. Tolerating clear liquids. BS hypo, no gas. Armendariz w/ good output. PIV w/ LR at 125 ml/hr. Dangled/standing at bedside w/ SBA.

## 2021-01-30 LAB — GLUCOSE BLDC GLUCOMTR-MCNC: 133 MG/DL (ref 70–99)

## 2021-01-30 PROCEDURE — 999N001017 HC STATISTIC GLUCOSE BY METER IP

## 2021-01-30 PROCEDURE — 250N000011 HC RX IP 250 OP 636: Performed by: SPECIALIST

## 2021-01-30 PROCEDURE — 120N000001 HC R&B MED SURG/OB

## 2021-01-30 PROCEDURE — 258N000003 HC RX IP 258 OP 636: Performed by: SPECIALIST

## 2021-01-30 PROCEDURE — 250N000013 HC RX MED GY IP 250 OP 250 PS 637: Performed by: SPECIALIST

## 2021-01-30 RX ADMIN — ATOMOXETINE 60 MG: 60 CAPSULE ORAL at 08:52

## 2021-01-30 RX ADMIN — ENOXAPARIN SODIUM 40 MG: 40 INJECTION SUBCUTANEOUS at 10:10

## 2021-01-30 RX ADMIN — DOCUSATE SODIUM 100 MG: 100 CAPSULE, LIQUID FILLED ORAL at 08:52

## 2021-01-30 RX ADMIN — TRAZODONE HYDROCHLORIDE 50 MG: 50 TABLET ORAL at 21:54

## 2021-01-30 RX ADMIN — FAMOTIDINE 20 MG: 20 TABLET ORAL at 08:52

## 2021-01-30 RX ADMIN — HYDROCHLOROTHIAZIDE 25 MG: 25 TABLET ORAL at 08:52

## 2021-01-30 RX ADMIN — SODIUM CHLORIDE, POTASSIUM CHLORIDE, SODIUM LACTATE AND CALCIUM CHLORIDE: 600; 310; 30; 20 INJECTION, SOLUTION INTRAVENOUS at 08:50

## 2021-01-30 RX ADMIN — LOSARTAN POTASSIUM 25 MG: 25 TABLET, FILM COATED ORAL at 08:52

## 2021-01-30 RX ADMIN — DOCUSATE SODIUM 100 MG: 100 CAPSULE, LIQUID FILLED ORAL at 21:53

## 2021-01-30 RX ADMIN — ACETAMINOPHEN 975 MG: 325 TABLET, FILM COATED ORAL at 21:53

## 2021-01-30 RX ADMIN — HYDROXYZINE HYDROCHLORIDE 50 MG: 25 TABLET, FILM COATED ORAL at 21:54

## 2021-01-30 RX ADMIN — BUPROPION HYDROCHLORIDE 300 MG: 300 TABLET, EXTENDED RELEASE ORAL at 08:52

## 2021-01-30 RX ADMIN — IBUPROFEN 600 MG: 600 TABLET, FILM COATED ORAL at 22:57

## 2021-01-30 RX ADMIN — IBUPROFEN 600 MG: 600 TABLET, FILM COATED ORAL at 17:17

## 2021-01-30 RX ADMIN — IBUPROFEN 600 MG: 600 TABLET, FILM COATED ORAL at 05:57

## 2021-01-30 RX ADMIN — FAMOTIDINE 20 MG: 20 TABLET ORAL at 21:54

## 2021-01-30 RX ADMIN — ACETAMINOPHEN 975 MG: 325 TABLET, FILM COATED ORAL at 05:57

## 2021-01-30 RX ADMIN — OXYCODONE HYDROCHLORIDE 5 MG: 5 TABLET ORAL at 10:10

## 2021-01-30 RX ADMIN — DULOXETINE HYDROCHLORIDE 60 MG: 60 CAPSULE, DELAYED RELEASE ORAL at 21:53

## 2021-01-30 RX ADMIN — ACETAMINOPHEN 975 MG: 325 TABLET, FILM COATED ORAL at 13:29

## 2021-01-30 ASSESSMENT — ACTIVITIES OF DAILY LIVING (ADL)
ADLS_ACUITY_SCORE: 15

## 2021-01-30 ASSESSMENT — MIFFLIN-ST. JEOR: SCORE: 1356.5

## 2021-01-30 NOTE — PLAN OF CARE
Summary: 1/29 1900-0730    Primary Diagnosis: Abnormal bleeding/stress incontinence.  MIRIAM BSO, urethral sling, open appendectomy POD2  Orientation: A/O x 4  Aggression Stop Light: Green  Mobility: SBA  Pain Management: lower abd/incisional pain, Scheduled Tylenol, PRN ibuprofen  Diet: Low Fiber diet  Bowel/Bladder: Void trial this a.m.. will update.  Vaginal packing-removed. Abnormal Lab/Assessments:  NA  Drain/Device/Wound: Lower abdominal incision, UTV. Abd binder.  Dressing CDI.  Consults: GYN, General surgery  D/C Day/Goals/Place: Possibly today 1/30    Shift Note:       POD 2. AOx4. VSS on RA. Pain controlled with scheduled tylenol and PRN ibuprofen x2. Had increased pain upon standing but it improved with walking. Incision dressing, CDI, abd binder on. Vaginal packing removed by MD. BS: hypo, passing gas. Low fiber diet, tolerating well. Up SBA. Armendariz patent, good output. L PIV infusing LR@125. Void trial this a.m.. will update. Possible discharge later in day.

## 2021-01-30 NOTE — DISCHARGE INSTRUCTIONS
INCONTINENCE/Pelvic Surgery  Instructions for Caring for yourself after Surgery     How do I manage my pain?   Pain and tenderness should lessen each day. To help keep pain under control, use the following guidelines:    Apply ice packs to your perineum (vaginal and rectal area) for the 1st couple of days.    Take 600 milligrams (mg) of ibuprofen (Advil) every 6 hours for the 1st several days.     Use your prescribed narcotic (hydromorphone) for additional pain relief as needed.    Do not drive, drink alcohol or make any major decisions, such as signing important papers or managing legal issues, while taking prescription pain medication.     Take pain medication with food to avoid an upset stomach.    How do I care for my perineum?    Use pads for vaginal discharge after surgery. Discharge is normal and can last several weeks. Discharge may appear bloody, yellow or white.    Do not place anything in your vagina until advised by your doctor.     What about bathing?       Do not take a tub bath, use a hot tub or swim until advised by your doctor. You may take showers.    What about bowel and bladder management?    Keep stools soft and regular. We recommend using the following medicine that loosens stools and increases bowel movements:  - MiraLAX-  17 grams or a capful daily following surgery.      When urinating, do not bear down. Relax and allow the bladder muscle to contract. If you are unable to urinate, contact your doctor.    If you go home with a catheter, your doctor may prescribe an antibiotic for you to take before bed to help prevent infection. Follow up in the clinic as instructed to have the catheter removed.    What about activity?    Do not lift more than 10 pounds for 12 weeks after surgery. Avoid heavy pushing or pulling, such as vacuuming or lawn mowing. Your body s tissues need time to heal and regain maximum strength.    Keep Active. Walking is encouraged. Gradually build up how long and far you  walk. Climbing stairs is OK if able.        You may resume driving when you are no longer taking narcotic pain medication and have the strength to use the brake pedal as needed.     When do I call my doctor?  Call Dr. Pinto call [phone] if you have:     (for urgent questions/concerns CELL PHONE [phone])    -Any post-operative questions or concerns     -A fever over 100.4 F (38 C)      -Difficulty emptying your bladder    -Chills         -Worsening pain                -Nausea or vomiting

## 2021-01-30 NOTE — PROGRESS NOTES
[facility] Gynecology Post-Op / Progress Note         Assessment and Plan:    Assessment:   Post-operative day #2  Abdominal hysterectomy, appendectomy, retropubic sling procedure.      Doing well.      Plan:   Will work on improved pain plan today.     Anticipate discharge 1/31/2021           Interval History:   Doing well.  Continues to improve.  Pain is only moderately controlled.  Scared to use oxycodone. .  No fevers.  Passed voiding trail this morning.  Only has teenage son at home to help her.           Significant Problems:    None          Review of Systems:    The patient denies any chest pain, shortness of breath, excessive pain, fever, chills, purulent drainage from the wound, nausea or vomiting.          Medications:   All medications related to the patient's surgery have been reviewed          Physical Exam:   Temp: 97.4  F (36.3  C) Temp src: Oral BP: 117/56 Pulse: 92   Resp: 16 SpO2: 95 % O2 Device: None (Room air)    Wound clean and dry with minimal or no drainage.  Surrounding skin with minimal erythema.          Data:     All laboratory data related to this surgery reviewed  Hemoglobin   Date Value Ref Range Status   01/29/2021 12.9 11.7 - 15.7 g/dL Final   01/28/2021 13.4 11.7 - 15.7 g/dL Final   07/12/2019 14.2 11.7 - 15.7 g/dL Final   05/18/2007 14.3 11.7 - 15.7 g/dL Final   05/09/2005 14.6 11.7 - 15.7 g/dL Final     No imaging studies have been ordered    Chelsi Anderson MD

## 2021-01-30 NOTE — PLAN OF CARE
POD 1. AOx4. VSS on RA. CAPNO discontinued. Pain controlled with scheduled tylenol and PRN ibuprofen x2. Requests to stay away from oxy. Incision dressing, CDI, abd binder in place. Vaginal packing removed by MD. BS: hypo, passing gas. Low fiber diet, tolerating well. Up SBA, ambulating in room frequently. Stout patent, good output. L PIV infusing LR@125. IS teaching completed and demonstrated, tolerating well. Plan for stout removal @0700, see pt care order for instructions, possibly discharge later in day. Continue to monitor.

## 2021-01-31 VITALS
RESPIRATION RATE: 18 BRPM | HEART RATE: 96 BPM | TEMPERATURE: 97.7 F | BODY MASS INDEX: 35.87 KG/M2 | HEIGHT: 60 IN | SYSTOLIC BLOOD PRESSURE: 113 MMHG | DIASTOLIC BLOOD PRESSURE: 57 MMHG | OXYGEN SATURATION: 98 % | WEIGHT: 182.7 LBS

## 2021-01-31 PROCEDURE — 250N000013 HC RX MED GY IP 250 OP 250 PS 637: Performed by: SPECIALIST

## 2021-01-31 RX ORDER — OXYCODONE HYDROCHLORIDE 5 MG/1
5-10 TABLET ORAL
Qty: 10 TABLET | Refills: 0 | Status: SHIPPED | OUTPATIENT
Start: 2021-01-31

## 2021-01-31 RX ADMIN — IBUPROFEN 600 MG: 600 TABLET, FILM COATED ORAL at 05:54

## 2021-01-31 RX ADMIN — DOCUSATE SODIUM 100 MG: 100 CAPSULE, LIQUID FILLED ORAL at 08:26

## 2021-01-31 RX ADMIN — LOSARTAN POTASSIUM 25 MG: 25 TABLET, FILM COATED ORAL at 08:26

## 2021-01-31 RX ADMIN — ATOMOXETINE 60 MG: 60 CAPSULE ORAL at 08:26

## 2021-01-31 RX ADMIN — HYDROCHLOROTHIAZIDE 25 MG: 25 TABLET ORAL at 08:26

## 2021-01-31 RX ADMIN — ACETAMINOPHEN 975 MG: 325 TABLET, FILM COATED ORAL at 05:54

## 2021-01-31 RX ADMIN — OXYCODONE HYDROCHLORIDE 5 MG: 5 TABLET ORAL at 09:31

## 2021-01-31 RX ADMIN — BUPROPION HYDROCHLORIDE 300 MG: 300 TABLET, EXTENDED RELEASE ORAL at 08:27

## 2021-01-31 RX ADMIN — FAMOTIDINE 20 MG: 20 TABLET ORAL at 08:26

## 2021-01-31 ASSESSMENT — ACTIVITIES OF DAILY LIVING (ADL)
ADLS_ACUITY_SCORE: 15

## 2021-01-31 ASSESSMENT — MIFFLIN-ST. JEOR: SCORE: 1350.22

## 2021-01-31 NOTE — PROGRESS NOTES
[facility] Gynecology Progress Note     POD #3 S/P MIRIAM/appendectomy, retropubic  ureteral sling  Doing well  Discharge to home.  Remove staples, apply steri strips         Interval History:   Feeling better.  Still with moderate pain.  Managing with ibuprofen and tylenol           Significant Problems:   None          Review of Systems:   The Review of Systems is negative other than noted in the HPI          Medications:     Current Facility-Administered Medications   Medication     acetaminophen (TYLENOL) tablet 650 mg     acetaminophen (TYLENOL) tablet 975 mg     atomoxetine (STRATTERA) capsule 60 mg     buPROPion (WELLBUTRIN XL) 24 hr tablet 300 mg     docusate sodium (COLACE) capsule 100 mg     DULoxetine (CYMBALTA) DR capsule 60 mg     enoxaparin ANTICOAGULANT (LOVENOX) injection 40 mg     famotidine (PEPCID) tablet 20 mg    Or     famotidine (PEPCID) injection 20 mg     hydrochlorothiazide (HYDRODIURIL) tablet 25 mg     HYDROmorphone (PF) (DILAUDID) injection 0.3-0.5 mg     hydrOXYzine (ATARAX) tablet 50 mg     ibuprofen (ADVIL/MOTRIN) tablet 600 mg     ketorolac (TORADOL) injection 30 mg     lactated ringers infusion     lidocaine (LMX4) cream     lidocaine 1 % 0.1-1 mL     losartan (COZAAR) tablet 25 mg     ondansetron (ZOFRAN) injection 4 mg     ondansetron (ZOFRAN-ODT) ODT tab 4 mg     oxyCODONE (ROXICODONE) tablet 5-10 mg     sodium chloride (PF) 0.9% PF flush 3 mL     sodium chloride (PF) 0.9% PF flush 3 mL     traZODone (DESYREL) tablet 50 mg             Physical Exam:   Vitals were reviewed  Abdomen:   Incision appears well healed, no drainage noted               Data:   All laboratory     Attestation:  I have reviewed today's vital signs, notes, medications, labs and imaging.     Chelsi Anderson MD

## 2021-01-31 NOTE — PLAN OF CARE
Patient discharged at 10:08 AM to Discharged to home  IV was discontinued. Pain at time of discharge was 2/10, controlled with tylenol, ibuprofen and oxycodone. Belongings returned to patient.  Discharge instructions and medications reviewed with patient.  Patient verbalized understanding and all questions were answered.  Prescriptions given to patient.  At time of discharge, patient condition was stable and left the unit via WC escorted by NA and transported home by a friend.

## 2021-01-31 NOTE — PLAN OF CARE
POD 2. AOx4. VSS on RA. Pain controlled with scheduled tylenol, PRN oxycodone (5) and ibuprofen. Incision PENELOPE, staples, abd binder on. BS: active, passing gas. Low fiber diet, tolerating well. Up independent. Voiding adequately in BR, passed PVRs. L PIV SL. Discharge home tomorrow 1/31 after adequate pain control. Continue to monitor.

## 2021-01-31 NOTE — PLAN OF CARE
Summary: 1/30 1900-0730    Primary Diagnosis: Abnormal bleeding/stress incontinence.  MIRIAM BSO, urethral sling, open appendectomy POD3  Orientation: A/O x 4  Aggression Stop Light: Green  Mobility: Ind  Pain Management: lower abd/incisional pain, Scheduled Tylenol, PRN ibuprofen and oxycodone- has not requested oxycodone this shift  Diet: Low Fiber diet  Bowel/Bladder: BR  Vaginal packing-removed. Abnormal Lab/Assessments:  NA  Drain/Device/Wound: Lower abdominal incision, PENELOPE, staples. Abd binder.    Consults: GYN, General surgery  D/C Day/Goals/Place: Today 1/31    Shift Note:       POD 2. AOx4. VSS on RA. Pain controlled with scheduled tylenol/ibuprofen. Incision PENELOPE, staples, abd binder on. BS: active, passing gas. Low fiber diet, tolerating well. Up independent. Voiding adequately in BR, passed PVRs. L PIV SL. Discharge home today 1/31.

## 2021-02-01 LAB — COPATH REPORT: NORMAL

## 2021-09-19 ENCOUNTER — HEALTH MAINTENANCE LETTER (OUTPATIENT)
Age: 56
End: 2021-09-19

## 2021-12-24 ENCOUNTER — ALLIED HEALTH/NURSE VISIT (OUTPATIENT)
Dept: FAMILY MEDICINE | Facility: CLINIC | Age: 56
End: 2021-12-24
Payer: COMMERCIAL

## 2021-12-24 DIAGNOSIS — Z23 NEED FOR VACCINATION: Primary | ICD-10-CM

## 2021-12-24 PROCEDURE — 90682 RIV4 VACC RECOMBINANT DNA IM: CPT

## 2021-12-24 PROCEDURE — 90471 IMMUNIZATION ADMIN: CPT

## 2021-12-24 PROCEDURE — 99207 PR NO CHARGE NURSE ONLY: CPT

## 2021-12-24 NOTE — PROGRESS NOTES
Christel came in today for her Flu shot, see immunizations    Gayatri Rodríguez CMA ([facility])

## 2022-01-09 ENCOUNTER — HEALTH MAINTENANCE LETTER (OUTPATIENT)
Age: 57
End: 2022-01-09

## 2022-11-20 ENCOUNTER — HEALTH MAINTENANCE LETTER (OUTPATIENT)
Age: 57
End: 2022-11-20

## 2023-04-15 ENCOUNTER — HEALTH MAINTENANCE LETTER (OUTPATIENT)
Age: 58
End: 2023-04-15

## 2024-02-03 ENCOUNTER — HEALTH MAINTENANCE LETTER (OUTPATIENT)
Age: 59
End: 2024-02-03

## 2024-06-22 ENCOUNTER — HEALTH MAINTENANCE LETTER (OUTPATIENT)
Age: 59
End: 2024-06-22

## (undated) DEVICE — SPONGE LAP 18X18" X8435

## (undated) DEVICE — DRSG TELFA ISLAND 4X10"

## (undated) DEVICE — STPL LINEAR CUT 55MM TLC55

## (undated) DEVICE — DRSG ABDOMINAL 07 1/2X8" 7197D

## (undated) DEVICE — WIPES FOLEY CARE SURESTEP PROVON DFC100

## (undated) DEVICE — CATH TRAY FOLEY SURESTEP 16FR WDRAIN BAG STLK LATEX A300316A

## (undated) DEVICE — GLOVE PROTEXIS POWDER FREE 6.5 ORTHOPEDIC 2D73ET65

## (undated) DEVICE — TUBING IRRIG CYSTO/BLADDER SET 81" LF 2C4040

## (undated) DEVICE — LINEN TOWEL PACK X5 5464

## (undated) DEVICE — ESU GROUND PAD UNIVERSAL W/O CORD

## (undated) DEVICE — PACK TVT HYSTEROSCOPY SMA15HYFSE

## (undated) DEVICE — SOL WATER IRRIG 1000ML BOTTLE 2F7114

## (undated) DEVICE — SU PLAIN 3-0 CT 27" 852H

## (undated) DEVICE — STPL SKIN 35W ROTATING HEAD PRW35

## (undated) DEVICE — MANIFOLD NEPTUNE 4 PORT 700-20

## (undated) DEVICE — Device

## (undated) DEVICE — NDL SPINAL 18GA 3.5" 405184

## (undated) DEVICE — GLOVE PROTEXIS MICRO 6.0  2D73PM60

## (undated) DEVICE — SUCTION TIP YANKAUER STR K87

## (undated) DEVICE — DRSG TELFA ISLAND 4X8" 7541

## (undated) DEVICE — BLADE KNIFE SURG 11 371111

## (undated) DEVICE — SU VICRYL 0 CT-1 27" J340H

## (undated) DEVICE — SPONGE PACK VAGINAL 2"X9

## (undated) DEVICE — PREP DURAPREP 26ML APL 8630

## (undated) DEVICE — SU VICRYL 2-0 CT-2 27" J333H

## (undated) DEVICE — SU VICRYL 0 CT-1 CR 8X18" J740D

## (undated) DEVICE — SYR BULB IRRIG 50ML LATEX FREE 0035280

## (undated) DEVICE — ADH SKIN CLOSURE PREMIERPRO EXOFIN 1.0ML 3470

## (undated) DEVICE — SURGICEL POWDER ABSORBABLE HEMOSTAT 3GM 3013SP

## (undated) DEVICE — SPONGE BALL KERLIX ROUND XL W/O STRING LATEX 4935

## (undated) RX ORDER — FENTANYL CITRATE 0.05 MG/ML
INJECTION, SOLUTION INTRAMUSCULAR; INTRAVENOUS
Status: DISPENSED
Start: 2021-01-28

## (undated) RX ORDER — BUPIVACAINE HYDROCHLORIDE 5 MG/ML
INJECTION, SOLUTION EPIDURAL; INTRACAUDAL
Status: DISPENSED
Start: 2021-01-28

## (undated) RX ORDER — CEFAZOLIN SODIUM 2 G/100ML
INJECTION, SOLUTION INTRAVENOUS
Status: DISPENSED
Start: 2021-01-28

## (undated) RX ORDER — FENTANYL CITRATE 50 UG/ML
INJECTION, SOLUTION INTRAMUSCULAR; INTRAVENOUS
Status: DISPENSED
Start: 2021-01-28

## (undated) RX ORDER — PROPOFOL 10 MG/ML
INJECTION, EMULSION INTRAVENOUS
Status: DISPENSED
Start: 2021-01-28

## (undated) RX ORDER — HYDROMORPHONE HYDROCHLORIDE 1 MG/ML
INJECTION, SOLUTION INTRAMUSCULAR; INTRAVENOUS; SUBCUTANEOUS
Status: DISPENSED
Start: 2021-01-28

## (undated) RX ORDER — CEFAZOLIN SODIUM 1 G/3ML
INJECTION, POWDER, FOR SOLUTION INTRAMUSCULAR; INTRAVENOUS
Status: DISPENSED
Start: 2021-01-28

## (undated) RX ORDER — ACETAMINOPHEN 325 MG/1
TABLET ORAL
Status: DISPENSED
Start: 2021-01-28